# Patient Record
Sex: MALE | Race: WHITE | NOT HISPANIC OR LATINO | Employment: FULL TIME | URBAN - NONMETROPOLITAN AREA
[De-identification: names, ages, dates, MRNs, and addresses within clinical notes are randomized per-mention and may not be internally consistent; named-entity substitution may affect disease eponyms.]

---

## 2020-10-21 ENCOUNTER — HOSPITAL ENCOUNTER (EMERGENCY)
Facility: HOSPITAL | Age: 50
End: 2020-10-21
Attending: EMERGENCY MEDICINE | Admitting: EMERGENCY MEDICINE

## 2020-10-21 ENCOUNTER — HOSPITAL ENCOUNTER (OUTPATIENT)
Facility: HOSPITAL | Age: 50
Setting detail: OBSERVATION
LOS: 1 days | Discharge: HOME | End: 2020-10-21
Attending: EMERGENCY MEDICINE | Admitting: INTERNAL MEDICINE

## 2020-10-21 VITALS
OXYGEN SATURATION: 96 % | SYSTOLIC BLOOD PRESSURE: 242 MMHG | TEMPERATURE: 98.1 F | WEIGHT: 200 LBS | RESPIRATION RATE: 18 BRPM | HEART RATE: 81 BPM | DIASTOLIC BLOOD PRESSURE: 120 MMHG

## 2020-10-21 VITALS
SYSTOLIC BLOOD PRESSURE: 192 MMHG | BODY MASS INDEX: 28.63 KG/M2 | WEIGHT: 200 LBS | HEART RATE: 70 BPM | HEIGHT: 70 IN | TEMPERATURE: 98.1 F | DIASTOLIC BLOOD PRESSURE: 105 MMHG | RESPIRATION RATE: 20 BRPM | OXYGEN SATURATION: 98 %

## 2020-10-21 DIAGNOSIS — N48.30 PRIAPISM: Primary | ICD-10-CM

## 2020-10-21 DIAGNOSIS — N17.9 ACUTE KIDNEY INJURY (HCC): ICD-10-CM

## 2020-10-21 DIAGNOSIS — T19.4XXA FOREIGN BODY IN PENIS, INITIAL ENCOUNTER: ICD-10-CM

## 2020-10-21 DIAGNOSIS — N48.31: ICD-10-CM

## 2020-10-21 DIAGNOSIS — T19.4XXA: ICD-10-CM

## 2020-10-21 PROBLEM — I10 HYPERTENSION: Status: ACTIVE | Noted: 2020-10-21

## 2020-10-21 LAB
ALBUMIN SERPL BCP-MCNC: 4.1 G/DL (ref 3.5–5)
ALP SERPL-CCNC: 74 U/L (ref 46–116)
ALT SERPL W P-5'-P-CCNC: 29 U/L (ref 12–78)
ANION GAP SERPL CALCULATED.3IONS-SCNC: 7 MMOL/L (ref 4–13)
AST SERPL W P-5'-P-CCNC: 28 U/L (ref 5–45)
BASE EX.OXY STD BLDV CALC-SCNC: 57.7 % (ref 60–80)
BASE EXCESS BLDV CALC-SCNC: -7.7 MMOL/L
BASOPHILS # BLD AUTO: 0.06 THOUSANDS/ΜL (ref 0–0.1)
BASOPHILS NFR BLD AUTO: 1 % (ref 0–1)
BILIRUB SERPL-MCNC: 0.61 MG/DL (ref 0.2–1)
BUN SERPL-MCNC: 16 MG/DL (ref 5–25)
CALCIUM SERPL-MCNC: 8.7 MG/DL (ref 8.3–10.1)
CHLORIDE SERPL-SCNC: 102 MMOL/L (ref 100–108)
CO2 SERPL-SCNC: 28 MMOL/L (ref 21–32)
CREAT SERPL-MCNC: 1.7 MG/DL (ref 0.6–1.3)
EOSINOPHIL # BLD AUTO: 0.19 THOUSAND/ΜL (ref 0–0.61)
EOSINOPHIL NFR BLD AUTO: 2 % (ref 0–6)
ERYTHROCYTE [DISTWIDTH] IN BLOOD BY AUTOMATED COUNT: 13.1 % (ref 11.6–15.1)
GFR SERPL CREATININE-BSD FRML MDRD: 46 ML/MIN/1.73SQ M
GLUCOSE SERPL-MCNC: 120 MG/DL (ref 65–140)
HCO3 BLDV-SCNC: 23 MMOL/L (ref 24–30)
HCT VFR BLD AUTO: 45.5 % (ref 36.5–49.3)
HGB BLD-MCNC: 15.7 G/DL (ref 12–17)
IMM GRANULOCYTES # BLD AUTO: 0.05 THOUSAND/UL (ref 0–0.2)
IMM GRANULOCYTES NFR BLD AUTO: 1 % (ref 0–2)
LACTATE SERPL-SCNC: 1.7 MMOL/L (ref 0.5–2)
LYMPHOCYTES # BLD AUTO: 1.84 THOUSANDS/ΜL (ref 0.6–4.47)
LYMPHOCYTES NFR BLD AUTO: 17 % (ref 14–44)
MCH RBC QN AUTO: 31.6 PG (ref 26.8–34.3)
MCHC RBC AUTO-ENTMCNC: 34.5 G/DL (ref 31.4–37.4)
MCV RBC AUTO: 92 FL (ref 82–98)
MONOCYTES # BLD AUTO: 0.84 THOUSAND/ΜL (ref 0.17–1.22)
MONOCYTES NFR BLD AUTO: 8 % (ref 4–12)
NEUTROPHILS # BLD AUTO: 7.77 THOUSANDS/ΜL (ref 1.85–7.62)
NEUTS SEG NFR BLD AUTO: 71 % (ref 43–75)
NRBC BLD AUTO-RTO: 0 /100 WBCS
O2 CT BLDV-SCNC: 16.9 ML/DL
PCO2 BLDV: 66.6 MM HG (ref 42–50)
PH BLDV: 7.16 [PH] (ref 7.3–7.4)
PLATELET # BLD AUTO: 332 THOUSANDS/UL (ref 149–390)
PMV BLD AUTO: 9.8 FL (ref 8.9–12.7)
PO2 BLDV: 34.8 MM HG (ref 35–45)
POTASSIUM SERPL-SCNC: 3.4 MMOL/L (ref 3.5–5.3)
PROT SERPL-MCNC: 7.3 G/DL (ref 6.4–8.2)
RBC # BLD AUTO: 4.97 MILLION/UL (ref 3.88–5.62)
SODIUM SERPL-SCNC: 137 MMOL/L (ref 136–145)
WBC # BLD AUTO: 10.75 THOUSAND/UL (ref 4.31–10.16)

## 2020-10-21 PROCEDURE — 99284 EMERGENCY DEPT VISIT MOD MDM: CPT

## 2020-10-21 PROCEDURE — NC001 PR NO CHARGE: Performed by: PHYSICIAN ASSISTANT

## 2020-10-21 PROCEDURE — 99284 EMERGENCY DEPT VISIT MOD MDM: CPT | Performed by: EMERGENCY MEDICINE

## 2020-10-21 PROCEDURE — 36415 COLL VENOUS BLD VENIPUNCTURE: CPT | Performed by: EMERGENCY MEDICINE

## 2020-10-21 PROCEDURE — 99285 EMERGENCY DEPT VISIT HI MDM: CPT | Performed by: EMERGENCY MEDICINE

## 2020-10-21 PROCEDURE — 83605 ASSAY OF LACTIC ACID: CPT | Performed by: EMERGENCY MEDICINE

## 2020-10-21 PROCEDURE — 85025 COMPLETE CBC W/AUTO DIFF WBC: CPT | Performed by: EMERGENCY MEDICINE

## 2020-10-21 PROCEDURE — NC001 PR NO CHARGE: Performed by: INTERNAL MEDICINE

## 2020-10-21 PROCEDURE — 80053 COMPREHEN METABOLIC PANEL: CPT | Performed by: EMERGENCY MEDICINE

## 2020-10-21 PROCEDURE — 99219 PR INITIAL OBSERVATION CARE/DAY 50 MINUTES: CPT | Performed by: INTERNAL MEDICINE

## 2020-10-21 PROCEDURE — 82805 BLOOD GASES W/O2 SATURATION: CPT | Performed by: EMERGENCY MEDICINE

## 2020-10-21 PROCEDURE — 99204 OFFICE O/P NEW MOD 45 MIN: CPT | Performed by: UROLOGY

## 2020-10-21 PROCEDURE — 96374 THER/PROPH/DIAG INJ IV PUSH: CPT

## 2020-10-21 RX ORDER — TERBUTALINE SULFATE 1 MG/ML
0.25 INJECTION, SOLUTION SUBCUTANEOUS ONCE
Status: COMPLETED | OUTPATIENT
Start: 2020-10-21 | End: 2020-10-21

## 2020-10-21 RX ORDER — NAPROXEN 500 MG/1
500 TABLET ORAL EVERY 12 HOURS PRN
COMMUNITY
Start: 2020-07-08 | End: 2020-10-21

## 2020-10-21 RX ORDER — LISINOPRIL 10 MG/1
10 TABLET ORAL DAILY
COMMUNITY
Start: 2020-07-08 | End: 2020-10-21

## 2020-10-21 RX ORDER — PSEUDOEPHEDRINE HYDROCHLORIDE 30 MG/1
60 TABLET ORAL ONCE
Status: DISCONTINUED | OUTPATIENT
Start: 2020-10-21 | End: 2020-10-21

## 2020-10-21 RX ORDER — FENTANYL CITRATE 50 UG/ML
50 INJECTION, SOLUTION INTRAMUSCULAR; INTRAVENOUS ONCE
Status: COMPLETED | OUTPATIENT
Start: 2020-10-21 | End: 2020-10-21

## 2020-10-21 RX ORDER — BUPIVACAINE HYDROCHLORIDE 5 MG/ML
10 INJECTION, SOLUTION EPIDURAL; INTRACAUDAL ONCE
Status: COMPLETED | OUTPATIENT
Start: 2020-10-21 | End: 2020-10-21

## 2020-10-21 RX ADMIN — FENTANYL CITRATE 50 MCG: 50 INJECTION INTRAMUSCULAR; INTRAVENOUS at 06:30

## 2020-10-21 RX ADMIN — BUPIVACAINE HYDROCHLORIDE 10 ML: 5 INJECTION, SOLUTION EPIDURAL; INTRACAUDAL at 09:30

## 2020-10-21 RX ADMIN — TERBUTALINE SULFATE 0.25 MG: 1 INJECTION SUBCUTANEOUS at 06:36

## 2021-04-01 ENCOUNTER — HOSPITAL ENCOUNTER (INPATIENT)
Facility: HOSPITAL | Age: 51
LOS: 5 days | Discharge: HOME | DRG: 045 | End: 2021-04-06
Attending: EMERGENCY MEDICINE | Admitting: STUDENT IN AN ORGANIZED HEALTH CARE EDUCATION/TRAINING PROGRAM
Payer: COMMERCIAL

## 2021-04-01 ENCOUNTER — APPOINTMENT (EMERGENCY)
Dept: CT IMAGING | Facility: HOSPITAL | Age: 51
DRG: 045 | End: 2021-04-01
Payer: COMMERCIAL

## 2021-04-01 ENCOUNTER — APPOINTMENT (EMERGENCY)
Dept: RADIOLOGY | Facility: HOSPITAL | Age: 51
DRG: 045 | End: 2021-04-01
Payer: COMMERCIAL

## 2021-04-01 DIAGNOSIS — I10 ESSENTIAL HYPERTENSION: ICD-10-CM

## 2021-04-01 DIAGNOSIS — F32.1 CURRENT MODERATE EPISODE OF MAJOR DEPRESSIVE DISORDER WITHOUT PRIOR EPISODE (HCC): ICD-10-CM

## 2021-04-01 DIAGNOSIS — R47.81 SLURRED SPEECH: ICD-10-CM

## 2021-04-01 DIAGNOSIS — R09.89 SYMPTOMS OF CEREBROVASCULAR ACCIDENT (CVA): ICD-10-CM

## 2021-04-01 DIAGNOSIS — N17.9 ACUTE KIDNEY INJURY (HCC): ICD-10-CM

## 2021-04-01 DIAGNOSIS — R77.8 ELEVATED TROPONIN I LEVEL: ICD-10-CM

## 2021-04-01 DIAGNOSIS — R60.0 BILATERAL LEG EDEMA: ICD-10-CM

## 2021-04-01 DIAGNOSIS — I63.9 ACUTE CVA (CEREBROVASCULAR ACCIDENT) (HCC): ICD-10-CM

## 2021-04-01 DIAGNOSIS — I16.1 HYPERTENSIVE EMERGENCY: Primary | ICD-10-CM

## 2021-04-01 DIAGNOSIS — E78.5 HYPERLIPIDEMIA: ICD-10-CM

## 2021-04-01 DIAGNOSIS — I63.9 EMBOLIC STROKE (HCC): ICD-10-CM

## 2021-04-01 PROBLEM — I16.0 HYPERTENSIVE URGENCY: Status: ACTIVE | Noted: 2021-04-01

## 2021-04-01 PROBLEM — R79.89 ELEVATED TROPONIN LEVEL NOT DUE TO ACUTE CORONARY SYNDROME: Status: ACTIVE | Noted: 2021-04-01

## 2021-04-01 PROBLEM — F19.90 DRUG USE: Status: ACTIVE | Noted: 2021-04-01

## 2021-04-01 PROBLEM — Z72.0 TOBACCO USE: Status: ACTIVE | Noted: 2021-04-01

## 2021-04-01 PROBLEM — D72.829 LEUKOCYTOSIS: Status: ACTIVE | Noted: 2021-04-01

## 2021-04-01 LAB
ALBUMIN SERPL BCP-MCNC: 3.8 G/DL (ref 3.5–5)
ALP SERPL-CCNC: 84 U/L (ref 46–116)
ALT SERPL W P-5'-P-CCNC: 32 U/L (ref 12–78)
ANION GAP SERPL CALCULATED.3IONS-SCNC: 8 MMOL/L (ref 4–13)
APTT PPP: 28 SECONDS (ref 23–37)
AST SERPL W P-5'-P-CCNC: 18 U/L (ref 5–45)
BASOPHILS # BLD AUTO: 0.08 THOUSANDS/ΜL (ref 0–0.1)
BASOPHILS NFR BLD AUTO: 1 % (ref 0–1)
BILIRUB SERPL-MCNC: 0.47 MG/DL (ref 0.2–1)
BUN SERPL-MCNC: 17 MG/DL (ref 5–25)
CALCIUM SERPL-MCNC: 9.3 MG/DL (ref 8.3–10.1)
CHLORIDE SERPL-SCNC: 99 MMOL/L (ref 100–108)
CHOLEST SERPL-MCNC: 184 MG/DL (ref 50–200)
CO2 SERPL-SCNC: 33 MMOL/L (ref 21–32)
CREAT SERPL-MCNC: 1.53 MG/DL (ref 0.6–1.3)
EOSINOPHIL # BLD AUTO: 0.09 THOUSAND/ΜL (ref 0–0.61)
EOSINOPHIL NFR BLD AUTO: 1 % (ref 0–6)
ERYTHROCYTE [DISTWIDTH] IN BLOOD BY AUTOMATED COUNT: 13.1 % (ref 11.6–15.1)
EST. AVERAGE GLUCOSE BLD GHB EST-MCNC: 120 MG/DL
GFR SERPL CREATININE-BSD FRML MDRD: 52 ML/MIN/1.73SQ M
GLUCOSE SERPL-MCNC: 100 MG/DL (ref 65–140)
HBA1C MFR BLD: 5.8 %
HCT VFR BLD AUTO: 49.2 % (ref 36.5–49.3)
HDLC SERPL-MCNC: 41 MG/DL
HGB BLD-MCNC: 16.9 G/DL (ref 12–17)
IMM GRANULOCYTES # BLD AUTO: 0.09 THOUSAND/UL (ref 0–0.2)
IMM GRANULOCYTES NFR BLD AUTO: 1 % (ref 0–2)
INR PPP: 0.89 (ref 0.84–1.19)
LACTATE SERPL-SCNC: 1.3 MMOL/L (ref 0.5–2)
LDLC SERPL CALC-MCNC: 94 MG/DL (ref 0–100)
LIPASE SERPL-CCNC: 88 U/L (ref 73–393)
LYMPHOCYTES # BLD AUTO: 2.4 THOUSANDS/ΜL (ref 0.6–4.47)
LYMPHOCYTES NFR BLD AUTO: 19 % (ref 14–44)
MCH RBC QN AUTO: 31.1 PG (ref 26.8–34.3)
MCHC RBC AUTO-ENTMCNC: 34.3 G/DL (ref 31.4–37.4)
MCV RBC AUTO: 91 FL (ref 82–98)
MONOCYTES # BLD AUTO: 0.9 THOUSAND/ΜL (ref 0.17–1.22)
MONOCYTES NFR BLD AUTO: 7 % (ref 4–12)
NEUTROPHILS # BLD AUTO: 8.84 THOUSANDS/ΜL (ref 1.85–7.62)
NEUTS SEG NFR BLD AUTO: 71 % (ref 43–75)
NRBC BLD AUTO-RTO: 0 /100 WBCS
NT-PROBNP SERPL-MCNC: 514 PG/ML
PLATELET # BLD AUTO: 296 THOUSANDS/UL (ref 149–390)
PLATELET # BLD AUTO: 324 THOUSANDS/UL (ref 149–390)
PMV BLD AUTO: 9.7 FL (ref 8.9–12.7)
PMV BLD AUTO: 9.7 FL (ref 8.9–12.7)
POTASSIUM SERPL-SCNC: 3.6 MMOL/L (ref 3.5–5.3)
PROT SERPL-MCNC: 7.6 G/DL (ref 6.4–8.2)
PROTHROMBIN TIME: 11.9 SECONDS (ref 11.6–14.5)
RBC # BLD AUTO: 5.43 MILLION/UL (ref 3.88–5.62)
SODIUM SERPL-SCNC: 140 MMOL/L (ref 136–145)
TRIGL SERPL-MCNC: 244 MG/DL
TROPONIN I SERPL-MCNC: 0.72 NG/ML
TROPONIN I SERPL-MCNC: 0.89 NG/ML
TROPONIN I SERPL-MCNC: 0.89 NG/ML
WBC # BLD AUTO: 12.4 THOUSAND/UL (ref 4.31–10.16)

## 2021-04-01 PROCEDURE — 84484 ASSAY OF TROPONIN QUANT: CPT | Performed by: STUDENT IN AN ORGANIZED HEALTH CARE EDUCATION/TRAINING PROGRAM

## 2021-04-01 PROCEDURE — 85610 PROTHROMBIN TIME: CPT | Performed by: EMERGENCY MEDICINE

## 2021-04-01 PROCEDURE — 71045 X-RAY EXAM CHEST 1 VIEW: CPT

## 2021-04-01 PROCEDURE — 99285 EMERGENCY DEPT VISIT HI MDM: CPT

## 2021-04-01 PROCEDURE — 83036 HEMOGLOBIN GLYCOSYLATED A1C: CPT | Performed by: STUDENT IN AN ORGANIZED HEALTH CARE EDUCATION/TRAINING PROGRAM

## 2021-04-01 PROCEDURE — 96375 TX/PRO/DX INJ NEW DRUG ADDON: CPT

## 2021-04-01 PROCEDURE — 83690 ASSAY OF LIPASE: CPT | Performed by: EMERGENCY MEDICINE

## 2021-04-01 PROCEDURE — 80053 COMPREHEN METABOLIC PANEL: CPT | Performed by: EMERGENCY MEDICINE

## 2021-04-01 PROCEDURE — 70450 CT HEAD/BRAIN W/O DYE: CPT

## 2021-04-01 PROCEDURE — 84484 ASSAY OF TROPONIN QUANT: CPT | Performed by: EMERGENCY MEDICINE

## 2021-04-01 PROCEDURE — 99291 CRITICAL CARE FIRST HOUR: CPT | Performed by: EMERGENCY MEDICINE

## 2021-04-01 PROCEDURE — 83880 ASSAY OF NATRIURETIC PEPTIDE: CPT | Performed by: EMERGENCY MEDICINE

## 2021-04-01 PROCEDURE — 83605 ASSAY OF LACTIC ACID: CPT | Performed by: EMERGENCY MEDICINE

## 2021-04-01 PROCEDURE — G1004 CDSM NDSC: HCPCS

## 2021-04-01 PROCEDURE — 85025 COMPLETE CBC W/AUTO DIFF WBC: CPT | Performed by: EMERGENCY MEDICINE

## 2021-04-01 PROCEDURE — 99223 1ST HOSP IP/OBS HIGH 75: CPT | Performed by: STUDENT IN AN ORGANIZED HEALTH CARE EDUCATION/TRAINING PROGRAM

## 2021-04-01 PROCEDURE — 36415 COLL VENOUS BLD VENIPUNCTURE: CPT | Performed by: EMERGENCY MEDICINE

## 2021-04-01 PROCEDURE — 96374 THER/PROPH/DIAG INJ IV PUSH: CPT

## 2021-04-01 PROCEDURE — 80061 LIPID PANEL: CPT | Performed by: STUDENT IN AN ORGANIZED HEALTH CARE EDUCATION/TRAINING PROGRAM

## 2021-04-01 PROCEDURE — 93005 ELECTROCARDIOGRAM TRACING: CPT

## 2021-04-01 PROCEDURE — 85049 AUTOMATED PLATELET COUNT: CPT | Performed by: STUDENT IN AN ORGANIZED HEALTH CARE EDUCATION/TRAINING PROGRAM

## 2021-04-01 PROCEDURE — 85730 THROMBOPLASTIN TIME PARTIAL: CPT | Performed by: EMERGENCY MEDICINE

## 2021-04-01 RX ORDER — METOPROLOL TARTRATE 5 MG/5ML
5 INJECTION INTRAVENOUS ONCE
Status: COMPLETED | OUTPATIENT
Start: 2021-04-01 | End: 2021-04-01

## 2021-04-01 RX ORDER — AMLODIPINE BESYLATE 10 MG/1
10 TABLET ORAL DAILY
Status: DISCONTINUED | OUTPATIENT
Start: 2021-04-01 | End: 2021-04-02

## 2021-04-01 RX ORDER — HEPARIN SODIUM 5000 [USP'U]/ML
5000 INJECTION, SOLUTION INTRAVENOUS; SUBCUTANEOUS EVERY 8 HOURS SCHEDULED
Status: DISCONTINUED | OUTPATIENT
Start: 2021-04-01 | End: 2021-04-06 | Stop reason: HOSPADM

## 2021-04-01 RX ORDER — HYDRALAZINE HYDROCHLORIDE 20 MG/ML
10 INJECTION INTRAMUSCULAR; INTRAVENOUS ONCE
Status: COMPLETED | OUTPATIENT
Start: 2021-04-01 | End: 2021-04-01

## 2021-04-01 RX ORDER — CLONIDINE HYDROCHLORIDE 0.1 MG/1
0.2 TABLET ORAL ONCE
Status: COMPLETED | OUTPATIENT
Start: 2021-04-01 | End: 2021-04-01

## 2021-04-01 RX ORDER — LABETALOL 20 MG/4 ML (5 MG/ML) INTRAVENOUS SYRINGE
10 EVERY 6 HOURS PRN
Status: DISCONTINUED | OUTPATIENT
Start: 2021-04-01 | End: 2021-04-03

## 2021-04-01 RX ADMIN — LABETALOL 20 MG/4 ML (5 MG/ML) INTRAVENOUS SYRINGE 10 MG: at 18:26

## 2021-04-01 RX ADMIN — HYDRALAZINE HYDROCHLORIDE 10 MG: 20 INJECTION INTRAMUSCULAR; INTRAVENOUS at 12:35

## 2021-04-01 RX ADMIN — AMLODIPINE BESYLATE 10 MG: 10 TABLET ORAL at 18:25

## 2021-04-01 RX ADMIN — HEPARIN SODIUM 5000 UNITS: 5000 INJECTION INTRAVENOUS; SUBCUTANEOUS at 18:25

## 2021-04-01 RX ADMIN — METOPROLOL TARTRATE 5 MG: 5 INJECTION INTRAVENOUS at 13:13

## 2021-04-01 RX ADMIN — CLONIDINE HYDROCHLORIDE 0.2 MG: 0.1 TABLET ORAL at 14:31

## 2021-04-01 NOTE — PLAN OF CARE
Problem: Potential for Falls  Goal: Patient will remain free of falls  Description: INTERVENTIONS:  - Assess patient frequently for physical needs  -  Identify cognitive and physical deficits and behaviors that affect risk of falls    -  [place] fall precautions as indicated by assessment   - Educate patient/family on patient safety including physical limitations  - Instruct patient to call for assistance with activity based on assessment  - Modify environment to reduce risk of injury  - Consider OT/PT consult to assist with strengthening/mobility  Outcome: Progressing     Problem: CARDIOVASCULAR - ADULT  Goal: Maintains optimal cardiac output and hemodynamic stability  Description: INTERVENTIONS:  - Monitor I/O, vital signs and rhythm  - Monitor for S/S and trends of decreased cardiac output  - Administer and titrate ordered vasoactive medications to optimize hemodynamic stability  - Assess quality of pulses, skin color and temperature  - Assess for signs of decreased coronary artery perfusion  - Instruct patient to report change in severity of symptoms  Outcome: Progressing  Goal: Absence of cardiac dysrhythmias or at baseline rhythm  Description: INTERVENTIONS:  - Continuous cardiac monitoring, vital signs, obtain 12 lead EKG if ordered  - Administer antiarrhythmic and heart rate control medications as ordered  - Monitor electrolytes and administer replacement therapy as ordered  Outcome: Progressing     Problem: PAIN - ADULT  Goal: Verbalizes/displays adequate comfort level or baseline comfort level  Description: Interventions:  - Encourage patient to monitor pain and request assistance  - Assess pain using appropriate pain scale  - Administer analgesics based on type and severity of pain and evaluate response  - Implement non-pharmacological measures as appropriate and evaluate response  - Consider cultural and social influences on pain and pain management  - Notify physician/advanced practitioner if interventions unsuccessful or patient reports new pain  Outcome: Progressing     Problem: INFECTION - ADULT  Goal: Absence or prevention of progression during hospitalization  Description: INTERVENTIONS:  - Assess and monitor for signs and symptoms of infection  - Monitor lab/diagnostic results  - Monitor all insertion sites, i e  indwelling lines, tubes, and drains  - Monitor endotracheal if appropriate and nasal secretions for changes in amount and color  - [place] appropriate cooling/warming therapies per order  - Administer medications as ordered  - Instruct and encourage patient and family to use good hand hygiene technique  - Identify and instruct in appropriate isolation precautions for identified infection/condition  Outcome: Progressing  Goal: Absence of fever/infection during neutropenic period  Description: INTERVENTIONS:  - Monitor WBC    Outcome: Progressing     Problem: SAFETY ADULT  Goal: Patient will remain free of falls  Description: INTERVENTIONS:  - Assess patient frequently for physical needs  -  Identify cognitive and physical deficits and behaviors that affect risk of falls    -  [place] fall precautions as indicated by assessment   - Educate patient/family on patient safety including physical limitations  - Instruct patient to call for assistance with activity based on assessment  - Modify environment to reduce risk of injury  - Consider OT/PT consult to assist with strengthening/mobility  Outcome: Progressing  Goal: Maintain or return to baseline ADL function  Description: INTERVENTIONS:  -  Assess patient's ability to carry out ADLs; assess patient's baseline for ADL function and identify physical deficits which impact ability to perform ADLs (bathing, care of mouth/teeth, toileting, grooming, dressing, etc )  - Assess/evaluate cause of self-care deficits   - Assess range of motion  - Assess patient's mobility; develop plan if impaired  - Assess patient's need for assistive devices and provide as appropriate  - Encourage maximum independence but intervene and supervise when necessary  - Involve family in performance of ADLs  - Assess for home care needs following discharge   - Consider OT consult to assist with ADL evaluation and planning for discharge  - Provide patient education as appropriate  Outcome: Progressing  Goal: Maintain or return mobility status to optimal level  Description: INTERVENTIONS:  - Assess patient's baseline mobility status (ambulation, transfers, stairs, etc )    - Identify cognitive and physical deficits and behaviors that affect mobility  - Identify mobility aids required to assist with transfers and/or ambulation (gait belt, sit-to-stand, lift, walker, cane, etc )  - [place] fall precautions as indicated by assessment  - Record patient progress and toleration of activity level on Mobility SBAR; progress patient to next Phase/Stage  - Instruct patient to call for assistance with activity based on assessment  - Consider rehabilitation consult to assist with strengthening/weightbearing, etc   Outcome: Progressing     Problem: DISCHARGE PLANNING  Goal: Discharge to home or other facility with appropriate resources  Description: INTERVENTIONS:  - Identify barriers to discharge w/patient and caregiver  - Arrange for needed discharge resources and transportation as appropriate  - Identify discharge learning needs (meds, wound care, etc )  - Arrange for interpretive services to assist at discharge as needed  - Refer to Case Management Department for coordinating discharge planning if the patient needs post-hospital services based on physician/advanced practitioner order or complex needs related to functional status, cognitive ability, or social support system  Outcome: Progressing     Problem: Knowledge Deficit  Goal: Patient/family/caregiver demonstrates understanding of disease process, treatment plan, medications, and discharge instructions  Description: Complete learning assessment and assess knowledge base    Interventions:  - Provide teaching at level of understanding  - Provide teaching via preferred learning methods  Outcome: Progressing

## 2021-04-01 NOTE — ASSESSMENT & PLAN NOTE
· Admits to heavy smoking - 36 pack year smoker   · Offered nicotine patches however patient refused  · Counseled on smoking cessation

## 2021-04-01 NOTE — ASSESSMENT & PLAN NOTE
· Patient presenting with BP in 200s/100s  · No known history of HTN however has not seen a physician in >10 years  · Never been on any anti-hypertensive medications  · Patient states that he has been having weakness and slurred speech associated over the last 3 weeks  · No focal deficits on exam  · Patient also admits to methamphetamine use as well as marijuana - denies recent use however may contribute to his BP  · utox ordered  · [facility] as follows     "No evidence of acute infarct or intracranial hemorrhage    Patchy areas of microvascular ischemic change with small lacunar infarcts    Asymmetric right transverse and sigmoid sinus commonly can be a normal variant    If there is significant posterior fossa symptomatology clinically, further imaging assessment could be performed "    · BP still elevated despite hydralazine  · Will order prn labetalol and start patient on amlodipine 10 mg daily

## 2021-04-01 NOTE — ASSESSMENT & PLAN NOTE
· Troponin elevated to 0 72 on admission, denies any chest pain however does have shortness of breath  · Troponin elevation in setting of significant hypertension, possibly secondary to demand  · Will trend troponin  · Monitor patient on telemetry  · Obtain TTE given patient's history of shortness of breath and intermittent leg swelling  · Serial EKG

## 2021-04-01 NOTE — ED PROVIDER NOTES
History  Chief Complaint   Patient presents with    Hypertension     Pt reports slurred speech and off balance for the past 2-3 weeks  Pt went to sisters home today and took his BP while there which it was 230/133     Patient complains of slurred speech and feeling off balance for the past 3 weeks  Took his blood pressure today and was elevated at 2 30/133  Denies any chest pain  Denies any shortness of breath  No headaches  No nausea or vomiting  No focal weakness or numbness  States the slurred speech in the off balance feeling have been constant for the past 3 weeks  Patient is not take any antihypertensive medications  Patient assume the slurred speech was from his tooth being removed 3 weeks ago  Speech has not improved since he had an implant put in today  History provided by:  Patient   used: No    Hypertension  Severity:  Moderate  Onset quality:  Unable to specify  Timing:  Constant  Progression:  Unchanged  Chronicity:  New  Context: normal sodium, not herbal remedies and not OTC medications used    Relieved by:  Nothing  Worsened by:  Nothing  Ineffective treatments:  None tried  Associated symptoms: dizziness    Associated symptoms: no abdominal pain, no chest pain, no ear pain, no fever, no headaches, no hematuria, no loss of consciousness, no nausea, no neck pain, no palpitations, no peripheral edema, no shortness of breath, no syncope, not vomiting and no weakness        None       Past Medical History:   Diagnosis Date    Gout     Hypertension        History reviewed  No pertinent surgical history  History reviewed  No pertinent family history  I have reviewed and agree with the history as documented  E-Cigarette/Vaping     E-Cigarette/Vaping Substances     Social History     Tobacco Use    Smoking status: Current Some Day Smoker    Smokeless tobacco: Never Used   Substance Use Topics    Alcohol use:  Yes    Drug use: Yes     Types: Marijuana Review of Systems   Constitutional: Negative for chills and fever  HENT: Negative for ear pain, hearing loss, sore throat, trouble swallowing and voice change  Eyes: Negative for pain and discharge  Respiratory: Negative for cough, shortness of breath and wheezing  Cardiovascular: Negative for chest pain, palpitations and syncope  Gastrointestinal: Negative for abdominal pain, blood in stool, constipation, diarrhea, nausea and vomiting  Genitourinary: Negative for dysuria, flank pain, frequency and hematuria  Musculoskeletal: Negative for joint swelling, neck pain and neck stiffness  Skin: Negative for rash and wound  Neurological: Positive for dizziness  Negative for seizures, loss of consciousness, syncope, facial asymmetry, weakness and headaches  Psychiatric/Behavioral: Negative for hallucinations, self-injury and suicidal ideas  All other systems reviewed and are negative  Physical Exam  Physical Exam  Vitals signs and nursing note reviewed  Constitutional:       General: He is not in acute distress  Appearance: He is well-developed  HENT:      Head: Normocephalic and atraumatic  Right Ear: External ear normal       Left Ear: External ear normal    Eyes:      Conjunctiva/sclera: Conjunctivae normal       Pupils: Pupils are equal, round, and reactive to light  Neck:      Musculoskeletal: Normal range of motion and neck supple  Cardiovascular:      Rate and Rhythm: Normal rate and regular rhythm  Heart sounds: Normal heart sounds  No murmur  Pulmonary:      Effort: Pulmonary effort is normal       Breath sounds: Normal breath sounds  No wheezing or rales  Abdominal:      General: Bowel sounds are normal  There is no distension  Palpations: Abdomen is soft  Tenderness: There is no abdominal tenderness  There is no guarding or rebound  Musculoskeletal: Normal range of motion  General: No deformity     Skin:     General: Skin is warm and dry       Findings: No rash  Neurological:      General: No focal deficit present  Mental Status: He is alert and oriented to person, place, and time  Cranial Nerves: No cranial nerve deficit  Psychiatric:         Behavior: Behavior normal          Vital Signs  ED Triage Vitals [04/01/21 1224]   Temperature Pulse Respirations Blood Pressure SpO2   97 6 °F (36 4 °C) 100 19 (!) 232/142 99 %      Temp Source Heart Rate Source Patient Position - Orthostatic VS BP Location FiO2 (%)   Temporal Monitor Lying Right arm --      Pain Score       --           Vitals:    04/01/21 1224 04/01/21 1300 04/01/21 1431 04/01/21 1438   BP: (!) 232/142 (!) 190/103 (!) 177/95 (!) 179/106   Pulse: 100 99 71 68   Patient Position - Orthostatic VS: Lying Lying Lying Lying         Visual Acuity  Visual Acuity      Most Recent Value   L Pupil Size (mm)  3   R Pupil Size (mm)  3          ED Medications  Medications   hydrALAZINE (APRESOLINE) injection 10 mg (10 mg Intravenous Given 4/1/21 1235)   metoprolol (LOPRESSOR) injection 5 mg (5 mg Intravenous Given 4/1/21 1313)   cloNIDine (CATAPRES) tablet 0 2 mg (0 2 mg Oral Given 4/1/21 1431)       Diagnostic Studies  Results Reviewed     Procedure Component Value Units Date/Time    NT-BNP PRO [[id number]]  (Abnormal) Collected: 04/01/21 1231    Lab Status: Final result Specimen: Blood from Arm, Left Updated: 04/01/21 1339     NT-proBNP 514 pg/mL     Troponin I [[id number]]  (Abnormal) Collected: 04/01/21 1231    Lab Status: Final result Specimen: Blood from Arm, Left Updated: 04/01/21 1302     Troponin I 0 72 ng/mL     Lactic acid [[id number]]  (Normal) Collected: 04/01/21 1231    Lab Status: Final result Specimen: Blood from Arm, Left Updated: 04/01/21 1258     LACTIC ACID 1 3 mmol/L     Narrative:      Result may be elevated if tourniquet was used during collection      Comprehensive metabolic panel [[id number]]  (Abnormal) Collected: 04/01/21 1231    Lab Status: Final result Specimen: Blood from Arm, Left Updated: 04/01/21 1255     Sodium 140 mmol/L      Potassium 3 6 mmol/L      Chloride 99 mmol/L      CO2 33 mmol/L      ANION GAP 8 mmol/L      BUN 17 mg/dL      Creatinine 1 53 mg/dL      Glucose 100 mg/dL      Calcium 9 3 mg/dL      AST 18 U/L      ALT 32 U/L      Alkaline Phosphatase 84 U/L      Total Protein 7 6 g/dL      Albumin 3 8 g/dL      Total Bilirubin 0 47 mg/dL      eGFR 52 ml/min/1 73sq m     Narrative:      [place] guidelines for Chronic Kidney Disease (CKD):     Stage 1 with normal or high GFR (GFR > 90 mL/min/1 73 square meters)    Stage 2 Mild CKD (GFR = 60-89 mL/min/1 73 square meters)    Stage 3A Moderate CKD (GFR = 45-59 mL/min/1 73 square meters)    Stage 3B Moderate CKD (GFR = 30-44 mL/min/1 73 square meters)    Stage 4 Severe CKD (GFR = 15-29 mL/min/1 73 square meters)    Stage 5 End Stage CKD (GFR <15 mL/min/1 73 square meters)  Note: GFR calculation is accurate only with a steady state creatinine    Lipase [[id number]]  (Normal) Collected: 04/01/21 1231    Lab Status: Final result Specimen: Blood from Arm, Left Updated: 04/01/21 1255     Lipase 88 u/L     Protime-INR [[id number]]  (Normal) Collected: 04/01/21 1231    Lab Status: Final result Specimen: Blood from Arm, Left Updated: 04/01/21 1251     Protime 11 9 seconds      INR 0 89    APTT [[id number]]  (Normal) Collected: 04/01/21 1231    Lab Status: Final result Specimen: Blood from Arm, Left Updated: 04/01/21 1251     PTT 28 seconds     CBC and differential [[id number]]  (Abnormal) Collected: 04/01/21 1231    Lab Status: Final result Specimen: Blood from Arm, Left Updated: 04/01/21 1237     WBC 12 40 Thousand/uL      RBC 5 43 Million/uL      Hemoglobin 16 9 g/dL      Hematocrit 49 2 %      MCV 91 fL      MCH 31 1 pg      MCHC 34 3 g/dL      RDW 13 1 %      MPV 9 7 fL      Platelets 088 Thousands/uL      nRBC 0 /100 WBCs      Neutrophils Relative 71 %      Immat GRANS % 1 % Lymphocytes Relative 19 %      Monocytes Relative 7 %      Eosinophils Relative 1 %      Basophils Relative 1 %      Neutrophils Absolute 8 84 Thousands/µL      Immature Grans Absolute 0 09 Thousand/uL      Lymphocytes Absolute 2 40 Thousands/µL      Monocytes Absolute 0 90 Thousand/µL      Eosinophils Absolute 0 09 Thousand/µL      Basophils Absolute 0 08 Thousands/µL                  XR chest 1 view portable   Final Result by Cj Johnston MD (04/01 1152)      No acute cardiopulmonary disease                    Workstation performed: [id number]         CT head without contrast    (Results Pending)              Procedures  ECG 12 Lead Documentation Only    Date/Time: 4/1/2021 12:31 PM  Performed by: Suma Sanchez MD  Authorized by: Suma Sanchez MD     ECG reviewed by me, the ED Provider: yes    Patient location:  ED  Previous ECG:     Previous ECG:  Unavailable  Interpretation:     Interpretation: non-specific    Rate:     ECG rate:  90  Rhythm:     Rhythm: sinus rhythm    Ectopy:     Ectopy: none    QRS:     QRS axis:  Normal    CriticalCare Time  Performed by: Suma Sanchez MD  Authorized by: Suma Sanchez MD     Critical care provider statement:     Critical care time (minutes):  35    Critical care time was exclusive of:  Separately billable procedures and treating other patients    Critical care was necessary to treat or prevent imminent or life-threatening deterioration of the following conditions:  Cardiac failure    Critical care was time spent personally by me on the following activities:  Ordering and review of radiographic studies, ordering and review of laboratory studies, ordering and performing treatments and interventions, obtaining history from patient or surrogate, development of treatment plan with patient or surrogate, discussions with consultants, re-evaluation of patient's condition, evaluation of patient's response to treatment and examination of patient             ED Course  ED Course as of Apr 01 1509   Thu Apr 01, 2021   1240 In review of his old chart, the patient was on lisinopril in past   Patient does recall this  States he was on something but stopped taking it when the prescription ran out  1258 Creatinine in October was 1 7  Creatinine(!): 1 53   1346 Discussed with radiology doctor, Dr Vernon Velez  No infarct  No definite bleed noted  SBIRT 20yo+      Most Recent Value   SBIRT (24 yo +)   In order to provide better care to our patients, we are screening all of our patients for alcohol and drug use  Would it be okay to ask you these screening questions? No Filed at: 04/01/2021 1236   Initial Alcohol Screen: US AUDIT-C    1  How often do you have a drink containing alcohol?  0 Filed at: 04/01/2021 1236   2  How many drinks containing alcohol do you have on a typical day you are drinking? 0 Filed at: 04/01/2021 1236   3a  Male UNDER 65: How often do you have five or more drinks on one occasion? 0 Filed at: 04/01/2021 1236   3b  FEMALE Any Age, or MALE 65+: How often do you have 4 or more drinks on one occassion? 0 Filed at: 04/01/2021 1236   Audit-C Score  0 Filed at: 04/01/2021 1236   JUSTINE: How many times in the past year have you    Used an illegal drug or used a prescription medication for non-medical reasons? Never Filed at: 04/01/2021 1236                    MDM  Number of Diagnoses or Management Options  Elevated troponin I level:   Hypertensive emergency:   Slurred speech:   Diagnosis management comments: CT scan as read by radiologist shows no acute infarct or bleed  Patient with an elevated blood pressure  His troponin is elevated which could be due to the persistent high blood pressure  He has responded well to the hydralazine Lopressor as his blood pressure now is 190/100         Amount and/or Complexity of Data Reviewed  Clinical lab tests: reviewed  Review and summarize past medical records: yes        Disposition  Final diagnoses:   Hypertensive emergency   Elevated troponin I level   Slurred speech     Time reflects when diagnosis was documented in both MDM as applicable and the Disposition within this note     Time User Action Codes Description Comment    4/1/2021  1:02 PM Stew Aguirre Add [I10] Essential hypertension     4/1/2021  1:03 PM Betsy Aguirre Remove [I10] Essential hypertension     4/1/2021  1:03 PM Stew Aguirre Add [I16 1] Hypertensive emergency     4/1/2021  1:03 PM Stew Aguirre Add [R77 8] Elevated troponin I level     4/1/2021  1:35 PM Stew Aguirre Add [T16 76] Slurred speech       ED Disposition     ED Disposition Condition Date/Time Comment    Admit Stable Thu Apr 1, 2021  1:46 PM Case was discussed with Dr Judi Mendieta and the patient's admission status was agreed to be to the service of Dr Deniece Holter    None         Patient's Medications    No medications on file     No discharge procedures on file      PDMP Review     None          ED Provider  Electronically Signed by           Lazarus Christine, MD  04/01/21 1386 [facility] Alberto Wyatt MD  04/01/21 [id number] [facility] Alberto Wyatt MD  04/01/21 [id number]

## 2021-04-01 NOTE — ASSESSMENT & PLAN NOTE
· Patient presented with creatinine of 1 53 with unknown baseline (in October 2020, patient was here with creatinine of 1 7)  · Patient has not seen any provider in the last 10 years and has not been on any maintenance medications  · Patient unaware of any other comorbidities  · AKA protocol  · I&Os  · Urine studies  · Kidney and bladder sono  · Monitor creatinine  · Avoid nephrotoxins  · Avoid hypotensive episodes

## 2021-04-01 NOTE — H&P
[address]  H&P- Juan Carlos Villareal 1970, 45 y o  male MRN: [id number]  Unit/Bed#: -01 Encounter: [id number]  Primary Care Provider: No primary care provider on file  Date and time admitted to hospital: 4/1/2021 12:23 PM    * Hypertensive urgency  Assessment & Plan  · Patient presenting with BP in 200s/100s  · No known history of HTN however has not seen a physician in >10 years  · Never been on any anti-hypertensive medications  · Patient states that he has been having weakness and slurred speech associated over the last 3 weeks  · No focal deficits on exam  · Patient also admits to methamphetamine use as well as marijuana - denies recent use however may contribute to his BP  · utox ordered  · Mynor Allison as follows     "No evidence of acute infarct or intracranial hemorrhage    Patchy areas of microvascular ischemic change with small lacunar infarcts    Asymmetric right transverse and sigmoid sinus commonly can be a normal variant    If there is significant posterior fossa symptomatology clinically, further imaging assessment could be performed "    · BP still elevated despite hydralazine  · Will order prn labetalol and start patient on amlodipine 10 mg daily    Elevated troponin level not due to acute coronary syndrome  Assessment & Plan  · Troponin elevated to 0 72 on admission, denies any chest pain however does have shortness of breath  · Troponin elevation in setting of significant hypertension, possibly secondary to demand  · Will trend troponin  · Monitor patient on telemetry  · Obtain TTE given patient's history of shortness of breath and intermittent leg swelling  · Serial EKG    Acute kidney [facility])  Assessment & Plan  · Patient presented with creatinine of 1 53 with unknown baseline (in October 2020, patient was here with creatinine of 1 7)  · Patient has not seen any provider in the last 10 years and has not been on any maintenance medications  · Patient unaware of any other comorbidities  · AKA protocol  · I&Os  · Urine studies  · Kidney and bladder sono  · Monitor creatinine  · Avoid nephrotoxins  · Avoid hypotensive episodes    Leukocytosis  Assessment & Plan  · Unclear etiology  · Will continue to monitor  · No infectious signs at this time    Tobacco use  Assessment & Plan  · Admits to heavy smoking - 36 pack year smoker   · Offered nicotine patches however patient refused  · Counseled on smoking cessation    Drug use  Assessment & Plan  · Patient admits to using meth and marijuana  · Denies recent use  · utox ordered      VTE Prophylaxis: Enoxaparin (Lovenox)  / sequential compression device   Code Status:  Full code  POLST: There is no POLST form on file for this patient (pre-hospital)  Discussion with family:  Patient's sister    Anticipated Length of Stay:  Patient will be admitted on an Inpatient basis with an anticipated length of stay of  more than 2 midnights  Justification for Hospital Stay:  Hypertensive urgency, SRIRAM, cardiac workup    Total Time for Visit, including Counseling / Coordination of Care: 45 minutes  Greater than 50% of this total time spent on direct patient counseling and coordination of care  Chief Complaint:   Unsteady gait and slurred speech    History of Present Illness:    Katherine Guevara is a 45 y o  male who presents with unsteady gait and slurred speech that the patient had said had been going on for over 3 weeks now  The patient endorses that he symptoms have also been associated with shortness of breath  Patient states that his symptoms are sporadic not necessarily associated to any form of activity  The patient denies any blurring of vision, extremity weakness, numbness, tingling  Patient endorsed associated intermittent headaches as well  The patient is unable to describe what worsens his symptoms  The patient denies any chest pain, lightheadedness    The patient denies any fevers, chills, recent cough, colds, sick contacts or recent travel  The patient denies any abdominal pain, diarrhea  The patient has not seen any doctor in the last 10 years and does not know of any other comorbidities  Review of Systems:    Review of Systems   Constitutional: Negative for chills and fever  HENT: Negative for ear pain and sore throat  Eyes: Negative for pain and visual disturbance  Respiratory: Positive for shortness of breath  Negative for cough  Cardiovascular: Negative for chest pain and palpitations  Gastrointestinal: Negative for abdominal pain and vomiting  Genitourinary: Negative for dysuria and hematuria  Musculoskeletal: Negative for arthralgias and back pain  Skin: Negative for color change and rash  Neurological: Positive for speech difficulty  Negative for seizures and syncope  All other systems reviewed and are negative  Past Medical and Surgical History:     Past Medical History:   Diagnosis Date    Gout     Hypertension        History reviewed  No pertinent surgical history  Meds/Allergies:    Prior to Admission medications    Not on File     I have reviewed home medications with patient personally      Allergies: No Known Allergies    Social History:     Marital Status:    Occupation:  Unemployed  Patient Pre-hospital Living Situation:  Lives with a friend  Patient Pre-hospital Level of Mobility:  Ambulates without assistance and independent in all ADLs  Patient Pre-hospital Diet Restrictions:  None  Substance Use History:   Social History     Substance and Sexual Activity   Alcohol Use Yes     Social History     Tobacco Use   Smoking Status Current Some Day Smoker   Smokeless Tobacco Never Used     Social History     Substance and Sexual Activity   Drug Use Yes    Types: Marijuana       Family History:    Father - diabetes    Physical Exam:     Vitals:   Blood Pressure: (!) 189/109 (04/01/21 1827)  Pulse: 66 (04/01/21 1827)  Temperature: 98 °F (36 7 °C) (04/01/21 1718)  Temp Source: Oral (04/01/21 1718)  Respirations: (!) 23 (04/01/21 1827)  Height: 5' 10" (177 8 cm) (04/01/21 1227)  Weight - Scale: 100 kg (221 lb 5 5 oz) (04/01/21 1227)  SpO2: 97 % (04/01/21 1827)    Physical Exam  Vitals signs and nursing note reviewed  Constitutional:       Appearance: He is well-developed  HENT:      Head: Normocephalic and atraumatic  Eyes:      Conjunctiva/sclera: Conjunctivae normal    Neck:      Musculoskeletal: Neck supple  Cardiovascular:      Rate and Rhythm: Normal rate and regular rhythm  Heart sounds: No murmur  Pulmonary:      Effort: Pulmonary effort is normal  No respiratory distress  Breath sounds: Normal breath sounds  Abdominal:      Palpations: Abdomen is soft  Tenderness: There is no abdominal tenderness  Skin:     General: Skin is warm and dry  Neurological:      General: No focal deficit present  Mental Status: He is alert and oriented to person, place, and time  Cranial Nerves: No cranial nerve deficit  Additional Data:     Lab Results: I have personally reviewed pertinent reports  Results from last 7 days   Lab Units 04/01/21  1854 04/01/21  1231   WBC Thousand/uL  --  12 40*   HEMOGLOBIN g/dL  --  16 9   HEMATOCRIT %  --  49 2   PLATELETS Thousands/uL 296 324   NEUTROS PCT %  --  71   LYMPHS PCT %  --  19   MONOS PCT %  --  7   EOS PCT %  --  1     Results from last 7 days   Lab Units 04/01/21  1231   SODIUM mmol/L 140   POTASSIUM mmol/L 3 6   CHLORIDE mmol/L 99*   CO2 mmol/L 33*   BUN mg/dL 17   CREATININE mg/dL 1 53*   ANION GAP mmol/L 8   CALCIUM mg/dL 9 3   ALBUMIN g/dL 3 8   TOTAL BILIRUBIN mg/dL 0 47   ALK PHOS U/L 84   ALT U/L 32   AST U/L 18   GLUCOSE RANDOM mg/dL 100     Results from last 7 days   Lab Units 04/01/21  1231   INR  0 89             Results from last 7 days   Lab Units 04/01/21  1231   LACTIC ACID mmol/L 1 3       Imaging: I have personally reviewed pertinent reports        CT head without contrast   Final Result by Wendi Mcgowan MD (04/01 1521)      No evidence of acute infarct or intracranial hemorrhage  Patchy areas of microvascular ischemic change with small lacunar infarcts  Asymmetric right transverse and sigmoid sinus commonly can be a normal variant  If there is significant posterior fossa symptomatology clinically, further imaging assessment could be performed  Results were discussed with Dr Nikita Johnson performed: [id number]         XR chest 1 view portable   Final Result by Wendi Mcgowan MD (04/01 1502)      No acute cardiopulmonary disease  Workstation performed: [id number]         US kidney and bladder    (Results Pending)   MRI inpatient order    (Results Pending)       EKG, Pathology, and Other Studies Reviewed on Admission:   · EKG:  Reviewed    Allscripts / Epic Records Reviewed: Yes     ** Please Note: This note has been constructed using a voice recognition system   **

## 2021-04-02 ENCOUNTER — APPOINTMENT (INPATIENT)
Dept: NON INVASIVE DIAGNOSTICS | Facility: HOSPITAL | Age: 51
DRG: 045 | End: 2021-04-02
Payer: COMMERCIAL

## 2021-04-02 ENCOUNTER — APPOINTMENT (INPATIENT)
Dept: ULTRASOUND IMAGING | Facility: HOSPITAL | Age: 51
DRG: 045 | End: 2021-04-02
Payer: COMMERCIAL

## 2021-04-02 ENCOUNTER — APPOINTMENT (INPATIENT)
Dept: MRI IMAGING | Facility: HOSPITAL | Age: 51
DRG: 045 | End: 2021-04-02
Payer: COMMERCIAL

## 2021-04-02 PROBLEM — E78.5 HYPERLIPIDEMIA: Status: ACTIVE | Noted: 2021-04-02

## 2021-04-02 PROBLEM — I63.9 ACUTE CVA (CEREBROVASCULAR ACCIDENT) (HCC): Status: ACTIVE | Noted: 2021-04-02

## 2021-04-02 LAB
ALBUMIN SERPL BCP-MCNC: 3.3 G/DL (ref 3.5–5)
ALP SERPL-CCNC: 65 U/L (ref 46–116)
ALT SERPL W P-5'-P-CCNC: 33 U/L (ref 12–78)
ANION GAP SERPL CALCULATED.3IONS-SCNC: 7 MMOL/L (ref 4–13)
AST SERPL W P-5'-P-CCNC: 19 U/L (ref 5–45)
BASOPHILS # BLD AUTO: 0.08 THOUSANDS/ΜL (ref 0–0.1)
BASOPHILS NFR BLD AUTO: 1 % (ref 0–1)
BILIRUB SERPL-MCNC: 0.76 MG/DL (ref 0.2–1)
BUN SERPL-MCNC: 17 MG/DL (ref 5–25)
CALCIUM ALBUM COR SERPL-MCNC: 9.3 MG/DL (ref 8.3–10.1)
CALCIUM SERPL-MCNC: 8.7 MG/DL (ref 8.3–10.1)
CHLORIDE SERPL-SCNC: 102 MMOL/L (ref 100–108)
CO2 SERPL-SCNC: 29 MMOL/L (ref 21–32)
CREAT SERPL-MCNC: 1.38 MG/DL (ref 0.6–1.3)
EOSINOPHIL # BLD AUTO: 0.19 THOUSAND/ΜL (ref 0–0.61)
EOSINOPHIL NFR BLD AUTO: 2 % (ref 0–6)
ERYTHROCYTE [DISTWIDTH] IN BLOOD BY AUTOMATED COUNT: 13.2 % (ref 11.6–15.1)
GFR SERPL CREATININE-BSD FRML MDRD: 59 ML/MIN/1.73SQ M
GLUCOSE SERPL-MCNC: 116 MG/DL (ref 65–140)
HCT VFR BLD AUTO: 46.5 % (ref 36.5–49.3)
HGB BLD-MCNC: 15.8 G/DL (ref 12–17)
IMM GRANULOCYTES # BLD AUTO: 0.06 THOUSAND/UL (ref 0–0.2)
IMM GRANULOCYTES NFR BLD AUTO: 1 % (ref 0–2)
LYMPHOCYTES # BLD AUTO: 3.51 THOUSANDS/ΜL (ref 0.6–4.47)
LYMPHOCYTES NFR BLD AUTO: 35 % (ref 14–44)
MAGNESIUM SERPL-MCNC: 2 MG/DL (ref 1.6–2.6)
MCH RBC QN AUTO: 30.6 PG (ref 26.8–34.3)
MCHC RBC AUTO-ENTMCNC: 34 G/DL (ref 31.4–37.4)
MCV RBC AUTO: 90 FL (ref 82–98)
MONOCYTES # BLD AUTO: 0.9 THOUSAND/ΜL (ref 0.17–1.22)
MONOCYTES NFR BLD AUTO: 9 % (ref 4–12)
NEUTROPHILS # BLD AUTO: 5.35 THOUSANDS/ΜL (ref 1.85–7.62)
NEUTS SEG NFR BLD AUTO: 52 % (ref 43–75)
NRBC BLD AUTO-RTO: 0 /100 WBCS
PHOSPHATE SERPL-MCNC: 3.5 MG/DL (ref 2.7–4.5)
PLATELET # BLD AUTO: 297 THOUSANDS/UL (ref 149–390)
PMV BLD AUTO: 9.6 FL (ref 8.9–12.7)
POTASSIUM SERPL-SCNC: 3.6 MMOL/L (ref 3.5–5.3)
PROT SERPL-MCNC: 6.6 G/DL (ref 6.4–8.2)
RBC # BLD AUTO: 5.16 MILLION/UL (ref 3.88–5.62)
SODIUM SERPL-SCNC: 138 MMOL/L (ref 136–145)
TROPONIN I SERPL-MCNC: 0.46 NG/ML
WBC # BLD AUTO: 10.09 THOUSAND/UL (ref 4.31–10.16)

## 2021-04-02 PROCEDURE — 99232 SBSQ HOSP IP/OBS MODERATE 35: CPT | Performed by: STUDENT IN AN ORGANIZED HEALTH CARE EDUCATION/TRAINING PROGRAM

## 2021-04-02 PROCEDURE — 93005 ELECTROCARDIOGRAM TRACING: CPT

## 2021-04-02 PROCEDURE — 76770 US EXAM ABDO BACK WALL COMP: CPT

## 2021-04-02 PROCEDURE — 83735 ASSAY OF MAGNESIUM: CPT | Performed by: STUDENT IN AN ORGANIZED HEALTH CARE EDUCATION/TRAINING PROGRAM

## 2021-04-02 PROCEDURE — 70551 MRI BRAIN STEM W/O DYE: CPT

## 2021-04-02 PROCEDURE — 84484 ASSAY OF TROPONIN QUANT: CPT | Performed by: STUDENT IN AN ORGANIZED HEALTH CARE EDUCATION/TRAINING PROGRAM

## 2021-04-02 PROCEDURE — 93306 TTE W/DOPPLER COMPLETE: CPT | Performed by: INTERNAL MEDICINE

## 2021-04-02 PROCEDURE — G1004 CDSM NDSC: HCPCS

## 2021-04-02 PROCEDURE — 84100 ASSAY OF PHOSPHORUS: CPT | Performed by: STUDENT IN AN ORGANIZED HEALTH CARE EDUCATION/TRAINING PROGRAM

## 2021-04-02 PROCEDURE — 85025 COMPLETE CBC W/AUTO DIFF WBC: CPT | Performed by: STUDENT IN AN ORGANIZED HEALTH CARE EDUCATION/TRAINING PROGRAM

## 2021-04-02 PROCEDURE — 93306 TTE W/DOPPLER COMPLETE: CPT

## 2021-04-02 PROCEDURE — 80053 COMPREHEN METABOLIC PANEL: CPT | Performed by: STUDENT IN AN ORGANIZED HEALTH CARE EDUCATION/TRAINING PROGRAM

## 2021-04-02 RX ORDER — AMLODIPINE BESYLATE 10 MG/1
10 TABLET ORAL DAILY
Status: DISCONTINUED | OUTPATIENT
Start: 2021-04-03 | End: 2021-04-06 | Stop reason: HOSPADM

## 2021-04-02 RX ORDER — ASPIRIN 81 MG/1
81 TABLET, CHEWABLE ORAL DAILY
Status: DISCONTINUED | OUTPATIENT
Start: 2021-04-02 | End: 2021-04-02 | Stop reason: SDUPTHER

## 2021-04-02 RX ORDER — ASPIRIN 81 MG/1
81 TABLET ORAL DAILY
Status: DISCONTINUED | OUTPATIENT
Start: 2021-04-02 | End: 2021-04-06 | Stop reason: HOSPADM

## 2021-04-02 RX ORDER — ATORVASTATIN CALCIUM 40 MG/1
40 TABLET, FILM COATED ORAL EVERY EVENING
Status: DISCONTINUED | OUTPATIENT
Start: 2021-04-02 | End: 2021-04-06 | Stop reason: HOSPADM

## 2021-04-02 RX ORDER — LABETALOL 200 MG/1
200 TABLET, FILM COATED ORAL EVERY 12 HOURS SCHEDULED
Status: DISCONTINUED | OUTPATIENT
Start: 2021-04-02 | End: 2021-04-02

## 2021-04-02 RX ORDER — ATORVASTATIN CALCIUM 20 MG/1
20 TABLET, FILM COATED ORAL
Status: DISCONTINUED | OUTPATIENT
Start: 2021-04-02 | End: 2021-04-02

## 2021-04-02 RX ADMIN — ASPIRIN 81 MG: 81 TABLET, COATED ORAL at 17:30

## 2021-04-02 RX ADMIN — LABETALOL 20 MG/4 ML (5 MG/ML) INTRAVENOUS SYRINGE 10 MG: at 05:57

## 2021-04-02 RX ADMIN — ATORVASTATIN CALCIUM 40 MG: 40 TABLET, FILM COATED ORAL at 18:23

## 2021-04-02 RX ADMIN — LABETALOL 20 MG/4 ML (5 MG/ML) INTRAVENOUS SYRINGE 10 MG: at 18:27

## 2021-04-02 RX ADMIN — AMLODIPINE BESYLATE 10 MG: 10 TABLET ORAL at 09:13

## 2021-04-02 NOTE — PROGRESS NOTES
[address]  Progress Note Chela Mullins 1970, 45 y o  male MRN: [id number]  Unit/Bed#: -01 Encounter: [id number]  Primary Care Provider: No primary care provider on file  Date and time admitted to hospital: 4/1/2021 12:23 PM    Acute CVA (cerebrovascular accident) [facility])  Assessment & Plan  Patient presenting to the ED with 3-week history of slurred speech and gait disturbance  On presentation, patient with hypertensive urgency as well as SRIRAM  CTH negative for acute inchemia however showing multiple lacunar infarcts  MRI obtained - results significant for acute embolic infarcts  Stroke pathway initiated  Patient was already started on statin for elevated ascvd risk however increased dose to 40  Start patient on aspirin  Neuro checks  Neuro consult placed, recs appreciated  -"Given age needs CHRISTIANNE  - possible loop  - hypercoagulable panel   - CT C/A/P with/without contrast to assess for any maligany and b/l LE dopplers "      PT/OT/Speech       Normalize BP  * Hypertensive urgency  Assessment & Plan  · Patient presenting with BP in 200s/100s  · No known history of HTN however has not seen a physician in >10 years  · Never been on any anti-hypertensive medications  · Patient states that he has been having weakness and slurred speech associated over the last 3 weeks  · No focal deficits on exam  · Patient also admits to methamphetamine use as well as marijuana - denies recent use however may contribute to his BP  · utox ordered  · Mynor Allison as follows     "No evidence of acute infarct or intracranial hemorrhage    Patchy areas of microvascular ischemic change with small lacunar infarcts    Asymmetric right transverse and sigmoid sinus commonly can be a normal variant    If there is significant posterior fossa symptomatology clinically, further imaging assessment could be performed "    · BP still elevated despite hydralazine  · Will order prn labetalol and start patient on amlodipine 10 mg daily  · Blood pressure today significantly IMPROVED on the amlodipine  · TTE significant for left ventricular hypertrophy likely secondary to long-standing uncontrolled hypertension    Elevated troponin level not due to acute coronary syndrome  Assessment & Plan  · Troponin elevated to 0 72 on admission, denies any chest pain however does have shortness of breath  · Troponin elevation in setting of significant hypertension, possibly secondary to demand  · Will trend troponin - increased to 0 8 however now 0 4   · Monitor patient on telemetry - no events  · TTE showing normal EF and concentric LVH likely secondary to longstanding HTN  · Given embolic strokes, will start patient on Atorvastatin 40 mg qhs  · Serial EKG - non-ischemic    Acute kidney injury [facility])  Assessment & Plan  · Patient presented with creatinine of 1 53 with unknown baseline (in October 2020, patient was here with creatinine of 1 7)  · Patient has not seen any provider in the last 10 years and has not been on any maintenance medications  · Patient unaware of any other comorbidities  · SRIRAM protocol  · I&Os  · Kidney and bladder sono - normal  · Monitor creatinine - IMPROVING  · Avoid nephrotoxins  · Avoid hypotensive episodes    Leukocytosis  Assessment & Plan  · Unclear etiology  · Will continue to monitor  · No infectious signs at this time    RESOLVED    Hyperlipidemia  Assessment & Plan  · Will start patient on Atorvastatin 20 mg qhs as patient's calculated ASCVD Risk is 18 4%   · Lipid panel reviewed    Tobacco use  Assessment & Plan  · Admits to heavy smoking - 36 pack year smoker   · Offered nicotine patches however patient refused  · Counseled on smoking cessation    Drug use  Assessment & Plan  · Patient admits to using meth and marijuana  · Denies recent use  · utox ordered      VTE Pharmacologic Prophylaxis:   Pharmacologic: Heparin  Mechanical VTE Prophylaxis in Place: Yes    Patient Centered Rounds: I have performed bedside rounds with nursing staff today  Discussions with Specialists or Other Care Team Provider: none    Education and Discussions with Family / Patient: patient and sister    Time Spent for Care: 30 minutes  More than 50% of total time spent on counseling and coordination of care as described above  Current Length of Stay: 1 day(s)    Current Patient Status: Inpatient   Certification Statement: The patient will continue to require additional inpatient hospital stay due to medication titration    Discharge Plan: home    Code Status: Level 1 - Full Code      Subjective:   Patient seen and examined at bedside  No events overnight  Patient denies any weakness, headache, blurring of vision, lightheadedness  Patient denies any chest pain, shortness of breath  Review of Systems   Constitutional: Negative for chills and fever  HENT: Negative for ear pain and sore throat  Eyes: Negative for pain and visual disturbance  Respiratory: Negative for cough and shortness of breath  Cardiovascular: Negative for chest pain and palpitations  Gastrointestinal: Negative for abdominal pain and vomiting  Genitourinary: Negative for dysuria and hematuria  Musculoskeletal: Negative for arthralgias and back pain  Skin: Negative for color change and rash  Neurological: Negative for seizures and syncope  All other systems reviewed and are negative  Objective:     Vitals:   Temp (24hrs), Av °F (37 2 °C), Min:97 9 °F (36 6 °C), Max:100 2 °F (37 9 °C)    Temp:  [97 9 °F (36 6 °C)-100 2 °F (37 9 °C)] 98 4 °F (36 9 °C)  HR:  [59-70] 64  Resp:  [18-37] 23  BP: (136-181)/() 163/92  SpO2:  [94 %-98 %] 96 %  Body mass index is 31 89 kg/m²  Input and Output Summary (last 24 hours):        Intake/Output Summary (Last 24 hours) at 2021  Last data filed at 2021  Gross per 24 hour   Intake 600 ml   Output --   Net 600 ml       Physical Exam:     Physical Exam  Vitals signs and nursing note reviewed  Constitutional:       Appearance: He is well-developed  HENT:      Head: Normocephalic and atraumatic  Eyes:      Conjunctiva/sclera: Conjunctivae normal    Neck:      Musculoskeletal: Neck supple  Cardiovascular:      Rate and Rhythm: Normal rate and regular rhythm  Heart sounds: No murmur  Pulmonary:      Effort: Pulmonary effort is normal  No respiratory distress  Breath sounds: Normal breath sounds  Abdominal:      Palpations: Abdomen is soft  Tenderness: There is no abdominal tenderness  Skin:     General: Skin is warm and dry  Neurological:      General: No focal deficit present  Mental Status: He is alert and oriented to person, place, and time  Cranial Nerves: No cranial nerve deficit  Motor: No weakness  Additional Data:     Labs:    Results from last 7 days   Lab Units 04/02/21  0603   WBC Thousand/uL 10 09   HEMOGLOBIN g/dL 15 8   HEMATOCRIT % 46 5   PLATELETS Thousands/uL 297   NEUTROS PCT % 52   LYMPHS PCT % 35   MONOS PCT % 9   EOS PCT % 2     Results from last 7 days   Lab Units 04/02/21  0603   SODIUM mmol/L 138   POTASSIUM mmol/L 3 6   CHLORIDE mmol/L 102   CO2 mmol/L 29   BUN mg/dL 17   CREATININE mg/dL 1 38*   ANION GAP mmol/L 7   CALCIUM mg/dL 8 7   ALBUMIN g/dL 3 3*   TOTAL BILIRUBIN mg/dL 0 76   ALK PHOS U/L 65   ALT U/L 33   AST U/L 19   GLUCOSE RANDOM mg/dL 116     Results from last 7 days   Lab Units 04/01/21  1231   INR  0 89         Results from last 7 days   Lab Units 04/01/21  1854   HEMOGLOBIN A1C % 5 8*     Results from last 7 days   Lab Units 04/01/21  1231   LACTIC ACID mmol/L 1 3           * I Have Reviewed All Lab Data Listed Above  * Additional Pertinent Lab Tests Reviewed:  [address] Admission Reviewed    Imaging:    Reviewed    Recent Cultures (last 7 days):           Last 24 Hours Medication List:   Current Facility-Administered Medications   Medication Dose Route Frequency Provider Last Rate    [START ON 4/3/2021] amLODIPine  10 mg Oral Daily Geo Cedeño MD      aspirin  81 mg Oral Daily Geo Cedeño MD      atorvastatin  40 mg Oral QPM Geo Cedeño MD      heparin (porcine)  5,000 Units Subcutaneous [facility] [address] Geo Quiet Ellan Olszewski Pena-Teotico, MD      Labetalol HCl  10 mg Intravenous Q6H PRN Geo Cedeño MD          Today, Patient Was Seen By: Jeramie Root MD    ** Please Note: Dictation voice to text software may have been used in the creation of this document   **

## 2021-04-02 NOTE — ASSESSMENT & PLAN NOTE
· Will start patient on Atorvastatin 40 mg qhs as patient's calculated ASCVD Risk is 18 4%   · Lipid panel reviewed

## 2021-04-02 NOTE — ASSESSMENT & PLAN NOTE
· Patient presented with creatinine of 1 53 with unknown baseline (in October 2020, patient was here with creatinine of 1 7)  · Patient has not seen any provider in the last 10 years and has not been on any maintenance medications  · Patient unaware of any other comorbidities  · SRIRAM protocol  · I&Os  · Kidney and bladder sono - normal  · Monitor creatinine - IMPROVING  · Avoid nephrotoxins  · Avoid hypotensive episodes Routine  care and anticipatory guidance/other

## 2021-04-02 NOTE — ASSESSMENT & PLAN NOTE
· Patient presenting with BP in 200s/100s  · No known history of HTN however has not seen a physician in >10 years  · Never been on any anti-hypertensive medications  · Patient states that he has been having weakness and slurred speech associated over the last 3 weeks  · No focal deficits on exam  · Patient also admits to methamphetamine use as well as marijuana - denies recent use however may contribute to his BP  · utox ordered  · [facility] as follows     "No evidence of acute infarct or intracranial hemorrhage    Patchy areas of microvascular ischemic change with small lacunar infarcts    Asymmetric right transverse and sigmoid sinus commonly can be a normal variant    If there is significant posterior fossa symptomatology clinically, further imaging assessment could be performed "    · BP still elevated despite hydralazine  · Will order prn labetalol and start patient on amlodipine 10 mg daily  · Blood pressure today significantly IMPROVED on the amlodipine  · TTE significant for left ventricular hypertrophy likely secondary to long-standing uncontrolled hypertension

## 2021-04-02 NOTE — ASSESSMENT & PLAN NOTE
· Patient presenting with BP in 200s/100s  · No known history of HTN however has not seen a physician in >10 years  · Never been on any anti-hypertensive medications  · Patient states that he has been having weakness and slurred speech associated over the last 3 weeks  · No focal deficits on exam  · Patient also admits to methamphetamine use as well as marijuana - denies recent use however may contribute to his BP  · utox ordered  · [facility] as follows     "No evidence of acute infarct or intracranial hemorrhage    Patchy areas of microvascular ischemic change with small lacunar infarcts    Asymmetric right transverse and sigmoid sinus commonly can be a normal variant    If there is significant posterior fossa symptomatology clinically, further imaging assessment could be performed "    · BP still elevated despite hydralazine  · Patient's BP up and down on amlodipine 10 mg daily  · Added hydralazine 25 mg TID as patient cannot be started on ACEi/ARB in light of SRIRAM/CKD and also avoiding diuretics and hydrating prior to CT scan with contrast for embolic stroke work-up  · TTE significant for left ventricular hypertrophy likely secondary to long-standing uncontrolled hypertension

## 2021-04-02 NOTE — ASSESSMENT & PLAN NOTE
· Will start patient on Atorvastatin 20 mg qhs as patient's calculated ASCVD Risk is 18 4%   · Lipid panel reviewed

## 2021-04-02 NOTE — PROGRESS NOTES
Chart reviewed  Admit with hypertensive urgency  Scheduled Meds: IV labetalol        CM met with patient at the bedside,baseline information  was obtained  CM discussed the role of CM in helping the patient develop a discharge plan and assist the patient in carry out their plan  04/02/21 1049   Patient Information   Mental Status Alert   Primary Caregiver Self   Support System Friends   Activities of Daily Living Prior to Admission   Functional Status Independent   Assistive Device No device needed   Living Arrangement House;Lives with someone   [address] [facility] Unemployed   Means of Transportation   Means of Transport to Appts: Drive Self       Patient has identified: his sister Ashley Rush as his primary   No POA: No advance directive: At this time patient is not interested in receiving information  Pt verbalized Ashley Rush would be the person assisting with decisions with making if need be  PCP: None             Pt Does NOT HAVE a prescription plan and uses [facility] in [place]        Pt is not a [place]:   Pt express he does have anxiety/ depression, however does not seek care for this- denies any [address] IP admission  Pt admits to Meth use and THC use  Been using Meth for 3 years  He is not interested in having any assistance with stopping,  He has never seeked treatment for this  CM offered resources in case he changed his mind, he politely declined  Pt has 2 adult children ( [address] and [place])    He is residing with his friend in a 1 story home  At [facility] [facility] set up: 1 story home  Functional level PTA: I/pta--  - unemployed and is receiving unemployment  DME use:  none  Prior use of Home Care or STR: none  Transportation: pt drives   Community Resources: none    CM reviewed No PCP and educated on [facility] health clinics ( [facility]) and [address] [place] [address] in [place]   Pt was interested in going to [place] [address] [facility]-- Information placed on AVS     No prescription plan-- CM provided Good Rx Card and a prescription saving coupon  CM will continue to follow

## 2021-04-02 NOTE — ASSESSMENT & PLAN NOTE
Patient presenting to the ED with 3-week history of slurred speech and gait disturbance  On presentation, patient with hypertensive urgency as well as SRIRAM  CTH negative for acute inchemia however showing multiple lacunar infarcts  MRI obtained - results significant for acute embolic infarcts  Stroke pathway initiated  Patient was already started on statin for elevated ascvd risk however increased dose to 40  Start patient on aspirin  Neuro checks  Neuro consult placed, recs appreciated  -"Given age needs CHRISTIANNE  - possible loop  - hypercoagulable panel   - CT C/A/P with/without contrast to assess for any maligany and b/l LE dopplers "      PT evaluated patient and patient cleared to be discharged to home once medically cleared     Normalize BP

## 2021-04-02 NOTE — ASSESSMENT & PLAN NOTE
· Troponin elevated to 0 72 on admission, denies any chest pain however does have shortness of breath  · Troponin elevation in setting of significant hypertension, possibly secondary to demand  · Will trend troponin - increased to 0 8 however now 0 4   · Monitor patient on telemetry - no events  · TTE showing normal EF and concentric LVH likely secondary to longstanding HTN  · Given CVA, patient started on Atorvastatin 40 mg qhs  · Serial EKG - non-ischemic

## 2021-04-02 NOTE — UTILIZATION REVIEW
Initial Clinical Review    Admission: Date/Time/Statement:   Admission Orders (From admission, onward)     Ordered        04/01/21 1347  Inpatient Admission  Once                   Orders Placed This Encounter   Procedures    Inpatient Admission     Standing Status:   Standing     Number of Occurrences:   1     Order Specific Question:   Level of Care     Answer:   Med Surg [16]     Order Specific Question:   Estimated length of stay     Answer:   More than 2 Midnights     Order Specific Question:   Certification     Answer:   I certify that inpatient services are medically necessary for this patient for a duration of greater than two midnights  See H&P and MD Progress Notes for additional information about the patient's course of treatment  ED Arrival Information     Expected Arrival Acuity Means of Arrival Escorted By Service Admission Type    - 4/1/2021 12:20 Emergent Walk-In [facility] Emergency    Arrival Complaint    slurring words, bp 230/133, coordination is off        Chief Complaint   Patient presents with    Hypertension     Pt reports slurred speech and off balance for the past 2-3 weeks  Pt went to sisters home today and took his BP while there which it was 230/133     Assessment/Plan: 45year old male to the ED from home with complaints of feeling off balance, slurred speech for 3 weeks prior to arrival  Admitted to inpatient for hypertensive urgency, elevated troponin, gracie  Arrives with  Very elevated bp  Given IV hydralazine, IV metoprolol and catapres po in the ED  CT head shows small lacunar infarcts  Start amlodipine and prn labetalol  GCS 15  Troponin elevated likely in the setting of htn  Continue to trend  Monitor tele  Check ECHO  Creat on arrival is 1 53 with unknown baseline     ED Triage Vitals   Temperature Pulse Respirations Blood Pressure SpO2   04/01/21 1224 04/01/21 1224 04/01/21 1224 04/01/21 1224 04/01/21 1224   97 6 °F (36 4 °C) 100 19 (!) 232/142 99 % Temp Source Heart Rate Source Patient Position - Orthostatic VS BP Location FiO2 (%)   04/01/21 1224 04/01/21 1224 04/01/21 1224 04/01/21 1224 --   Temporal Monitor Lying Right arm       Pain Score       04/01/21 1800       No Pain          Wt Readings from Last 1 Encounters:   04/02/21 101 kg (222 lb 3 6 oz)     Additional Vital Signs:   Time  Temp Pulse Resp  BP  MAP (mmHg)  SpO2  O2 Device Patient Position - Orthostatic VS   04/02/21 0900  -- 59 18  158/92  117  95 %  None (Room air) Lying   04/02/21 0700  98 9 °F (37 2 °C) 63 18  150/81  108  95 %  None (Room air) Lying   04/02/21 0554  -- 65 20  181/102Abnormal   133  94 %  None (Room air) Lying   04/02/21 0200  -- 67 18  --  --  96 %  -- --   04/02/21 0005  97 9 °F (36 6 °C) 59 19  177/101Abnormal   133  97 %  None (Room air) Lying   04/01/21 2000  -- -- --  --  --  --  None (Room air) --   04/01/21 1827  -- 66 23Abnormal   189/109Abnormal   143  97 %  -- --   04/01/21 1718  98 °F (36 7 °C) 71 20  192/112Abnormal   145  99 %  None (Room air) Sitting   04/01/21 1630  -- 69 20  191/109Abnormal   145  96 %  None (Room air) Lying   04/01/21 1600  -- 66 20  182/103Abnormal   136  96 %  None (Room air) Lying   04/01/21 1438  -- 68 --  179/106Abnormal   --  96 %  None (Room air) Lying   04/01/21 1431  -- 71 38Abnormal   177/95Abnormal   --  95 %  None (Room air) Lying   04/01/21 1300  -- 99 33Abnormal   190/103Abnormal   140  97 %  None (Room air) Lying   04/01/21 1224  97 6 °F (36 4 °C) 100 19  232/142Abnormal   --  99 %          Pertinent Labs/Diagnostic Test Results:   4/1 EKG:  Previous ECG:  Unavailable  Interpretation:     Interpretation: non-specific    Rate:     ECG rate:  90  Rhythm:     Rhythm: sinus rhythm    Ectopy:     Ectopy: none    QRS:     QRS axis:  Normal  CT head: No evidence of acute infarct or intracranial hemorrhage  Patchy areas of microvascular ischemic change with small lacunar infarcts       Asymmetric right transverse and sigmoid sinus commonly can be a normal variant   If there is significant posterior fossa symptomatology clinically, further imaging assessment could be performed  4/1 CXR: No acute cardiopulmonary disease         Results from last 7 days   Lab Units 04/02/21  0603 04/01/21  1854 04/01/21  1231   WBC Thousand/uL 10 09  --  12 40*   HEMOGLOBIN g/dL 15 8  --  16 9   HEMATOCRIT % 46 5  --  49 2   PLATELETS Thousands/uL 297 296 324   NEUTROS ABS Thousands/µL 5 35  --  8 84*         Results from last 7 days   Lab Units 04/02/21  0603 04/01/21  1231   SODIUM mmol/L 138 140   POTASSIUM mmol/L 3 6 3 6   CHLORIDE mmol/L 102 99*   CO2 mmol/L 29 33*   ANION GAP mmol/L 7 8   BUN mg/dL 17 17   CREATININE mg/dL 1 38* 1 53*   EGFR ml/min/1 73sq m 59 52   CALCIUM mg/dL 8 7 9 3   MAGNESIUM mg/dL 2 0  --    PHOSPHORUS mg/dL 3 5  --      Results from last 7 days   Lab Units 04/02/21  0603 04/01/21  1231   AST U/L 19 18   ALT U/L 33 32   ALK PHOS U/L 65 84   TOTAL PROTEIN g/dL 6 6 7 6   ALBUMIN g/dL 3 3* 3 8   TOTAL BILIRUBIN mg/dL 0 76 0 47         Results from last 7 days   Lab Units 04/02/21  0603 04/01/21  1231   GLUCOSE RANDOM mg/dL 116 100         Results from last 7 days   Lab Units 04/01/21  1854   HEMOGLOBIN A1C % 5 8*   EAG mg/dl 120     Results from last 7 days   Lab Units 04/02/21  0918 04/01/21  2139 04/01/21  1854 04/01/21  1231   TROPONIN I ng/mL 0 46* 0 89* 0 89* 0 72*         Results from last 7 days   Lab Units 04/01/21  1231   PROTIME seconds 11 9   INR  0 89   PTT seconds 28       Results from last 7 days   Lab Units 04/01/21  1231   LACTIC ACID mmol/L 1 3       Results from last 7 days   Lab Units 04/01/21  1231   NT-PRO BNP pg/mL 514*         Results from last 7 days   Lab Units 04/01/21  1231   LIPASE u/L 88           ED Treatment:   Medication Administration from 04/01/2021 1218 to 04/01/2021 1721       Date/Time Order Dose Route Action     04/01/2021 1235 hydrALAZINE (APRESOLINE) injection 10 mg 10 mg Intravenous Given 04/01/2021 1313 metoprolol (LOPRESSOR) injection 5 mg 5 mg Intravenous Given     04/01/2021 1431 cloNIDine (CATAPRES) tablet 0 2 mg 0 2 mg Oral Given        Past Medical History:   Diagnosis Date    Gout     Hypertension        Admitting Diagnosis: Slurred speech [R47 81]  Hypertension [I10]  Elevated troponin I level [R77 8]  Hypertensive emergency [I16 1]  Age/Sex: 45 y o  male  Admission Orders:  Neuro checks  I/O  Scheduled Medications:  amLODIPine, 10 mg, Oral, Daily  atorvastatin, 20 mg, Oral, Daily With Dinner  heparin (porcine), 5,000 Units, Subcutaneous, Q8H [facility] & intermediate      Continuous IV Infusions:     PRN Meds:  Labetalol HCl, 10 mg, Intravenous, Q6H PRN        None    Network Utilization Review Department  ATTENTION: Please call with any questions or concerns to [phone] and carefully listen to the prompts so that you are directed to the right person  All voicemails are confidential   Kellen Caraballo all requests for admission clinical reviews, approved or denied determinations and any other requests to dedicated fax number below belonging to the campus where the patient is receiving treatment   List of dedicated fax numbers for the Facilities:  [address] DENIALS (Administrative/Medical Necessity) [phone]   [address] (Maternity/NICU/Pediatrics) [phone]   [address] [address] [facility] Lisette Harry [address] ([address] [address] [place] [address] [place] [address] [place]) [address] [place] [address] [phone]

## 2021-04-02 NOTE — ASSESSMENT & PLAN NOTE
· Patient presented with creatinine of 1 53 with unknown baseline (in October 2020, patient was here with creatinine of 1 7)  · Patient has not seen any provider in the last 10 years and has not been on any maintenance medications  · Patient unaware of any other comorbidities  · SRIRAM protocol  · I&Os  · Kidney and bladder sono - normal  · Monitor creatinine - IMPROVING  · Avoid nephrotoxins  · Avoid hypotensive episodes  · Nephrology consulted for optimization of renal function prior to imaging with contrast for embolic stroke work-up, recommendations appreciated - patient currently on fluids at this time and will receive a bolus prior to CT scan then will hydrate patient again after CT scan and maintain him on IVF after imaging; patient not on ACEi/ARB, patient not on any diuretics

## 2021-04-02 NOTE — PLAN OF CARE
Problem: Potential for Falls  Goal: Patient will remain free of falls  Description: INTERVENTIONS:  - Assess patient frequently for physical needs  -  Identify cognitive and physical deficits and behaviors that affect risk of falls    -  [place] fall precautions as indicated by assessment   - Educate patient/family on patient safety including physical limitations  - Instruct patient to call for assistance with activity based on assessment  - Modify environment to reduce risk of injury  - Consider OT/PT consult to assist with strengthening/mobility  Outcome: Progressing     Problem: CARDIOVASCULAR - ADULT  Goal: Maintains optimal cardiac output and hemodynamic stability  Description: INTERVENTIONS:  - Monitor I/O, vital signs and rhythm  - Monitor for S/S and trends of decreased cardiac output  - Administer and titrate ordered vasoactive medications to optimize hemodynamic stability  - Assess quality of pulses, skin color and temperature  - Assess for signs of decreased coronary artery perfusion  - Instruct patient to report change in severity of symptoms  Outcome: Progressing  Goal: Absence of cardiac dysrhythmias or at baseline rhythm  Description: INTERVENTIONS:  - Continuous cardiac monitoring, vital signs, obtain 12 lead EKG if ordered  - Administer antiarrhythmic and heart rate control medications as ordered  - Monitor electrolytes and administer replacement therapy as ordered  Outcome: Progressing     Problem: PAIN - ADULT  Goal: Verbalizes/displays adequate comfort level or baseline comfort level  Description: Interventions:  - Encourage patient to monitor pain and request assistance  - Assess pain using appropriate pain scale  - Administer analgesics based on type and severity of pain and evaluate response  - Implement non-pharmacological measures as appropriate and evaluate response  - Consider cultural and social influences on pain and pain management  - Notify physician/advanced practitioner if interventions unsuccessful or patient reports new pain  Outcome: Progressing     Problem: INFECTION - ADULT  Goal: Absence or prevention of progression during hospitalization  Description: INTERVENTIONS:  - Assess and monitor for signs and symptoms of infection  - Monitor lab/diagnostic results  - Monitor all insertion sites, i e  indwelling lines, tubes, and drains  - Monitor endotracheal if appropriate and nasal secretions for changes in amount and color  - [place] appropriate cooling/warming therapies per order  - Administer medications as ordered  - Instruct and encourage patient and family to use good hand hygiene technique  - Identify and instruct in appropriate isolation precautions for identified infection/condition  Outcome: Progressing  Goal: Absence of fever/infection during neutropenic period  Description: INTERVENTIONS:  - Monitor WBC    Outcome: Progressing     Problem: SAFETY ADULT  Goal: Patient will remain free of falls  Description: INTERVENTIONS:  - Assess patient frequently for physical needs  -  Identify cognitive and physical deficits and behaviors that affect risk of falls    -  [place] fall precautions as indicated by assessment   - Educate patient/family on patient safety including physical limitations  - Instruct patient to call for assistance with activity based on assessment  - Modify environment to reduce risk of injury  - Consider OT/PT consult to assist with strengthening/mobility  Outcome: Progressing  Goal: Maintain or return to baseline ADL function  Description: INTERVENTIONS:  -  Assess patient's ability to carry out ADLs; assess patient's baseline for ADL function and identify physical deficits which impact ability to perform ADLs (bathing, care of mouth/teeth, toileting, grooming, dressing, etc )  - Assess/evaluate cause of self-care deficits   - Assess range of motion  - Assess patient's mobility; develop plan if impaired  - Assess patient's need for assistive devices and provide as appropriate  - Encourage maximum independence but intervene and supervise when necessary  - Involve family in performance of ADLs  - Assess for home care needs following discharge   - Consider OT consult to assist with ADL evaluation and planning for discharge  - Provide patient education as appropriate  Outcome: Progressing  Goal: Maintain or return mobility status to optimal level  Description: INTERVENTIONS:  - Assess patient's baseline mobility status (ambulation, transfers, stairs, etc )    - Identify cognitive and physical deficits and behaviors that affect mobility  - Identify mobility aids required to assist with transfers and/or ambulation (gait belt, sit-to-stand, lift, walker, cane, etc )  - [place] fall precautions as indicated by assessment  - Record patient progress and toleration of activity level on Mobility SBAR; progress patient to next Phase/Stage  - Instruct patient to call for assistance with activity based on assessment  - Consider rehabilitation consult to assist with strengthening/weightbearing, etc   Outcome: Progressing     Problem: DISCHARGE PLANNING  Goal: Discharge to home or other facility with appropriate resources  Description: INTERVENTIONS:  - Identify barriers to discharge w/patient and caregiver  - Arrange for needed discharge resources and transportation as appropriate  - Identify discharge learning needs (meds, wound care, etc )  - Arrange for interpretive services to assist at discharge as needed  - Refer to Case Management Department for coordinating discharge planning if the patient needs post-hospital services based on physician/advanced practitioner order or complex needs related to functional status, cognitive ability, or social support system  Outcome: Progressing     Problem: Knowledge Deficit  Goal: Patient/family/caregiver demonstrates understanding of disease process, treatment plan, medications, and discharge instructions  Description: Complete learning assessment and assess knowledge base    Interventions:  - Provide teaching at level of understanding  - Provide teaching via preferred learning methods  Outcome: Progressing

## 2021-04-02 NOTE — QUICK NOTE
MRI brain reviewed, multifocal embolic appearing strokes  Given age needs CHRISTIANNE, possible loop, hypercoagulable panel and CT C/A/P with/without contrast to assess for any maligany and b/l LE dopplers  PT/OT/Speech  Normalize BP

## 2021-04-02 NOTE — ASSESSMENT & PLAN NOTE
· Troponin elevated to 0 72 on admission, denies any chest pain however does have shortness of breath  · Troponin elevation in setting of significant hypertension, possibly secondary to demand  · Will trend troponin - increased to 0 8 however now 0 4   · Monitor patient on telemetry - no events  · TTE showing normal EF and concentric LVH likely secondary to longstanding HTN  · Will start patient on Atorvastatin 20 mg qhs as ASCVD risk 18 4   · Serial EKG - non-ischemic

## 2021-04-03 ENCOUNTER — APPOINTMENT (INPATIENT)
Dept: CT IMAGING | Facility: HOSPITAL | Age: 51
DRG: 045 | End: 2021-04-03
Payer: COMMERCIAL

## 2021-04-03 LAB
AMPHETAMINES SERPL QL SCN: POSITIVE
ANION GAP SERPL CALCULATED.3IONS-SCNC: 7 MMOL/L (ref 4–13)
BACTERIA UR QL AUTO: ABNORMAL /HPF
BARBITURATES UR QL: NEGATIVE
BENZODIAZ UR QL: NEGATIVE
BILIRUB UR QL STRIP: NEGATIVE
BUN SERPL-MCNC: 16 MG/DL (ref 5–25)
CALCIUM SERPL-MCNC: 8.7 MG/DL (ref 8.3–10.1)
CHLORIDE SERPL-SCNC: 104 MMOL/L (ref 100–108)
CLARITY UR: CLEAR
CO2 SERPL-SCNC: 28 MMOL/L (ref 21–32)
COCAINE UR QL: NEGATIVE
COLOR UR: YELLOW
CREAT SERPL-MCNC: 1.42 MG/DL (ref 0.6–1.3)
GFR SERPL CREATININE-BSD FRML MDRD: 57 ML/MIN/1.73SQ M
GLUCOSE SERPL-MCNC: 114 MG/DL (ref 65–140)
GLUCOSE UR STRIP-MCNC: NEGATIVE MG/DL
HGB UR QL STRIP.AUTO: NEGATIVE
KETONES UR STRIP-MCNC: NEGATIVE MG/DL
LEUKOCYTE ESTERASE UR QL STRIP: NEGATIVE
METHADONE UR QL: NEGATIVE
NITRITE UR QL STRIP: NEGATIVE
NON-SQ EPI CELLS URNS QL MICRO: ABNORMAL /HPF
OPIATES UR QL SCN: NEGATIVE
OXYCODONE+OXYMORPHONE UR QL SCN: NEGATIVE
PCP UR QL: NEGATIVE
PH UR STRIP.AUTO: 6.5 [PH]
POTASSIUM SERPL-SCNC: 3.7 MMOL/L (ref 3.5–5.3)
PROT UR STRIP-MCNC: NEGATIVE MG/DL
RBC #/AREA URNS AUTO: ABNORMAL /HPF
SODIUM SERPL-SCNC: 139 MMOL/L (ref 136–145)
SP GR UR STRIP.AUTO: 1.01 (ref 1–1.03)
THC UR QL: POSITIVE
UROBILINOGEN UR QL STRIP.AUTO: 0.2 E.U./DL
WBC #/AREA URNS AUTO: ABNORMAL /HPF

## 2021-04-03 PROCEDURE — 84156 ASSAY OF PROTEIN URINE: CPT | Performed by: STUDENT IN AN ORGANIZED HEALTH CARE EDUCATION/TRAINING PROGRAM

## 2021-04-03 PROCEDURE — 80307 DRUG TEST PRSMV CHEM ANLYZR: CPT | Performed by: NURSE PRACTITIONER

## 2021-04-03 PROCEDURE — 97116 GAIT TRAINING THERAPY: CPT

## 2021-04-03 PROCEDURE — 80048 BASIC METABOLIC PNL TOTAL CA: CPT | Performed by: STUDENT IN AN ORGANIZED HEALTH CARE EDUCATION/TRAINING PROGRAM

## 2021-04-03 PROCEDURE — 74177 CT ABD & PELVIS W/CONTRAST: CPT

## 2021-04-03 PROCEDURE — 99232 SBSQ HOSP IP/OBS MODERATE 35: CPT | Performed by: STUDENT IN AN ORGANIZED HEALTH CARE EDUCATION/TRAINING PROGRAM

## 2021-04-03 PROCEDURE — 81001 URINALYSIS AUTO W/SCOPE: CPT | Performed by: STUDENT IN AN ORGANIZED HEALTH CARE EDUCATION/TRAINING PROGRAM

## 2021-04-03 PROCEDURE — 97163 PT EVAL HIGH COMPLEX 45 MIN: CPT

## 2021-04-03 PROCEDURE — 99254 IP/OBS CNSLTJ NEW/EST MOD 60: CPT | Performed by: NURSE PRACTITIONER

## 2021-04-03 PROCEDURE — G1004 CDSM NDSC: HCPCS

## 2021-04-03 PROCEDURE — 84300 ASSAY OF URINE SODIUM: CPT | Performed by: STUDENT IN AN ORGANIZED HEALTH CARE EDUCATION/TRAINING PROGRAM

## 2021-04-03 PROCEDURE — 71260 CT THORAX DX C+: CPT

## 2021-04-03 PROCEDURE — 82570 ASSAY OF URINE CREATININE: CPT | Performed by: STUDENT IN AN ORGANIZED HEALTH CARE EDUCATION/TRAINING PROGRAM

## 2021-04-03 RX ORDER — SODIUM CHLORIDE 9 MG/ML
100 INJECTION, SOLUTION INTRAVENOUS CONTINUOUS
Status: DISCONTINUED | OUTPATIENT
Start: 2021-04-03 | End: 2021-04-04

## 2021-04-03 RX ORDER — HYDRALAZINE HYDROCHLORIDE 25 MG/1
25 TABLET, FILM COATED ORAL EVERY 8 HOURS SCHEDULED
Status: DISCONTINUED | OUTPATIENT
Start: 2021-04-03 | End: 2021-04-04

## 2021-04-03 RX ORDER — LABETALOL 20 MG/4 ML (5 MG/ML) INTRAVENOUS SYRINGE
10 EVERY 6 HOURS PRN
Status: DISCONTINUED | OUTPATIENT
Start: 2021-04-03 | End: 2021-04-06 | Stop reason: HOSPADM

## 2021-04-03 RX ADMIN — HYDRALAZINE HYDROCHLORIDE 25 MG: 25 TABLET ORAL at 22:39

## 2021-04-03 RX ADMIN — SODIUM CHLORIDE 100 ML/HR: 0.9 INJECTION, SOLUTION INTRAVENOUS at 11:45

## 2021-04-03 RX ADMIN — AMLODIPINE BESYLATE 10 MG: 10 TABLET ORAL at 08:56

## 2021-04-03 RX ADMIN — LABETALOL 20 MG/4 ML (5 MG/ML) INTRAVENOUS SYRINGE 10 MG: at 11:45

## 2021-04-03 RX ADMIN — HEPARIN SODIUM 5000 UNITS: 5000 INJECTION INTRAVENOUS; SUBCUTANEOUS at 22:37

## 2021-04-03 RX ADMIN — IOHEXOL 100 ML: 350 INJECTION, SOLUTION INTRAVENOUS at 23:38

## 2021-04-03 RX ADMIN — HEPARIN SODIUM 5000 UNITS: 5000 INJECTION INTRAVENOUS; SUBCUTANEOUS at 14:52

## 2021-04-03 RX ADMIN — ATORVASTATIN CALCIUM 40 MG: 40 TABLET, FILM COATED ORAL at 18:10

## 2021-04-03 RX ADMIN — LABETALOL 20 MG/4 ML (5 MG/ML) INTRAVENOUS SYRINGE 10 MG: at 18:10

## 2021-04-03 RX ADMIN — ASPIRIN 81 MG: 81 TABLET, COATED ORAL at 08:56

## 2021-04-03 RX ADMIN — HYDRALAZINE HYDROCHLORIDE 25 MG: 25 TABLET ORAL at 16:00

## 2021-04-03 RX ADMIN — SODIUM CHLORIDE 100 ML/HR: 0.9 INJECTION, SOLUTION INTRAVENOUS at 22:39

## 2021-04-03 NOTE — UTILIZATION REVIEW
Initial Clinical Review    Admission: Date/Time/Statement:   Admission Orders (From admission, onward)     Ordered        04/01/21 1347  Inpatient Admission  Once                   Orders Placed This Encounter   Procedures    Inpatient Admission     Standing Status:   Standing     Number of Occurrences:   1     Order Specific Question:   Level of Care     Answer:   Med Surg [16]     Order Specific Question:   Estimated length of stay     Answer:   More than 2 Midnights     Order Specific Question:   Certification     Answer:   I certify that inpatient services are medically necessary for this patient for a duration of greater than two midnights  See H&P and MD Progress Notes for additional information about the patient's course of treatment  ED Arrival Information     Expected Arrival Acuity Means of Arrival Escorted By Service Admission Type    - 4/1/2021 12:20 Emergent Walk-In [facility] Emergency    Arrival Complaint    slurring words, bp 230/133, coordination is off        Chief Complaint   Patient presents with    Hypertension     Pt reports slurred speech and off balance for the past 2-3 weeks  Pt went to sisters home today and took his BP while there which it was 230/133     Assessment/Plan: 45year old male to the ED from home with complaints of feeling off balance, slurred speech for 3 weeks prior to arrival  Admitted to inpatient for hypertensive urgency, elevated troponin, gracie  Arrives with  Very elevated bp  Given IV hydralazine, IV metoprolol and catapres po in the ED  CT head shows small lacunar infarcts  Start amlodipine and prn labetalol  GCS 15  Troponin elevated likely in the setting of htn  Continue to trend  Monitor tele  Check ECHO  Creat on arrival is 1 53 with unknown baseline  4/2 Progress notes; Acute CVA, Hypertensive urgency  Start on aspirin  Neuro checks  Neurology consult  Needs CHRISTIANNE  Possible Loop  Hypercoagulable panel   BP still elevated despite hydralazine  Add order prn labetalol and start amlodipine daily and BP improving  Tele monitoring  Given embolic strokes, will start patient on Atorvastatin 40 mg qhs  Serial EKG  Creat improving       ED Triage Vitals   Temperature Pulse Respirations Blood Pressure SpO2   04/01/21 1224 04/01/21 1224 04/01/21 1224 04/01/21 1224 04/01/21 1224   97 6 °F (36 4 °C) 100 19 (!) 232/142 99 %      Temp Source Heart Rate Source Patient Position - Orthostatic VS BP Location FiO2 (%)   04/01/21 1224 04/01/21 1224 04/01/21 1224 04/01/21 1224 --   Temporal Monitor Lying Right arm       Pain Score       04/01/21 1800       No Pain          Wt Readings from Last 1 Encounters:   04/03/21 101 kg (222 lb 10 6 oz)     Additional Vital Signs:   Time  Temp Pulse Resp  BP  MAP (mmHg)  SpO2  O2 Device Patient Position - Orthostatic VS   04/02/21 0900  -- 59 18  158/92  117  95 %  None (Room air) Lying   04/02/21 0700  98 9 °F (37 2 °C) 63 18  150/81  108  95 %  None (Room air) Lying   04/02/21 0554  -- 65 20  181/102Abnormal   133  94 %  None (Room air) Lying   04/02/21 0200  -- 67 18  --  --  96 %  -- --   04/02/21 0005  97 9 °F (36 6 °C) 59 19  177/101Abnormal   133  97 %  None (Room air) Lying   04/01/21 2000  -- -- --  --  --  --  None (Room air) --   04/01/21 1827  -- 66 23Abnormal   189/109Abnormal   143  97 %  -- --   04/01/21 1718  98 °F (36 7 °C) 71 20  192/112Abnormal   145  99 %  None (Room air) Sitting   04/01/21 1630  -- 69 20  191/109Abnormal   145  96 %  None (Room air) Lying   04/01/21 1600  -- 66 20  182/103Abnormal   136  96 %  None (Room air) Lying   04/01/21 1438  -- 68 --  179/106Abnormal   --  96 %  None (Room air) Lying   04/01/21 1431  -- 71 38Abnormal   177/95Abnormal   --  95 %  None (Room air) Lying   04/01/21 1300  -- 99 33Abnormal   190/103Abnormal   140  97 %  None (Room air) Lying   04/01/21 1224  97 6 °F (36 4 °C) 100 19  232/142Abnormal   --  99 %          Pertinent Labs/Diagnostic Test Results:   4/1 EKG: Previous ECG:  Unavailable  Interpretation:     Interpretation: non-specific    Rate:     ECG rate:  90  Rhythm:     Rhythm: sinus rhythm    Ectopy:     Ectopy: none    QRS:     QRS axis:  Normal  CT head: No evidence of acute infarct or intracranial hemorrhage  Patchy areas of microvascular ischemic change with small lacunar infarcts  Asymmetric right transverse and sigmoid sinus commonly can be a normal variant   If there is significant posterior fossa symptomatology clinically, further imaging assessment could be performed  4/1 CXR: No acute cardiopulmonary disease         Results from last 7 days   Lab Units 04/02/21  0603 04/01/21  1854 04/01/21  1231   WBC Thousand/uL 10 09  --  12 40*   HEMOGLOBIN g/dL 15 8  --  16 9   HEMATOCRIT % 46 5  --  49 2   PLATELETS Thousands/uL 297 296 324   NEUTROS ABS Thousands/µL 5 35  --  8 84*         Results from last 7 days   Lab Units 04/02/21  0603 04/01/21  1231   SODIUM mmol/L 138 140   POTASSIUM mmol/L 3 6 3 6   CHLORIDE mmol/L 102 99*   CO2 mmol/L 29 33*   ANION GAP mmol/L 7 8   BUN mg/dL 17 17   CREATININE mg/dL 1 38* 1 53*   EGFR ml/min/1 73sq m 59 52   CALCIUM mg/dL 8 7 9 3   MAGNESIUM mg/dL 2 0  --    PHOSPHORUS mg/dL 3 5  --      Results from last 7 days   Lab Units 04/02/21  0603 04/01/21  1231   AST U/L 19 18   ALT U/L 33 32   ALK PHOS U/L 65 84   TOTAL PROTEIN g/dL 6 6 7 6   ALBUMIN g/dL 3 3* 3 8   TOTAL BILIRUBIN mg/dL 0 76 0 47         Results from last 7 days   Lab Units 04/02/21  0603 04/01/21  1231   GLUCOSE RANDOM mg/dL 116 100         Results from last 7 days   Lab Units 04/01/21  1854   HEMOGLOBIN A1C % 5 8*   EAG mg/dl 120     Results from last 7 days   Lab Units 04/02/21  0918 04/01/21  2139 04/01/21  1854 04/01/21  1231   TROPONIN I ng/mL 0 46* 0 89* 0 89* 0 72*         Results from last 7 days   Lab Units 04/01/21  1231   PROTIME seconds 11 9   INR  0 89   PTT seconds 28       Results from last 7 days   Lab Units 04/01/21  1231   LACTIC ACID mmol/L 1 3       Results from last 7 days   Lab Units 04/01/21  1231   NT-PRO BNP pg/mL 514*         Results from last 7 days   Lab Units 04/01/21  1231   LIPASE u/L 88           ED Treatment:   Medication Administration from 04/01/2021 1218 to 04/01/2021 1721       Date/Time Order Dose Route Action     04/01/2021 1235 hydrALAZINE (APRESOLINE) injection 10 mg 10 mg Intravenous Given     04/01/2021 1313 metoprolol (LOPRESSOR) injection 5 mg 5 mg Intravenous Given     04/01/2021 1431 cloNIDine (CATAPRES) tablet 0 2 mg 0 2 mg Oral Given        Past Medical History:   Diagnosis Date    Gout     Hypertension        Admitting Diagnosis: Slurred speech [R47 81]  Hypertension [I10]  Elevated troponin I level [R77 8]  Hypertensive emergency [I16 1]  Age/Sex: 45 y o  male  Admission Orders:  Neuro checks  I/O  Scheduled Medications:  amLODIPine, 10 mg, Oral, Daily  aspirin, 81 mg, Oral, Daily  atorvastatin, 40 mg, Oral, QPM  heparin (porcine), 5,000 Units, Subcutaneous, Q8H [facility]  hydrALAZINE, 25 mg, Oral, Q8H [facility]      Continuous IV Infusions:  sodium chloride, 100 mL/hr, Intravenous, Continuous      PRN Meds:  Labetalol HCl, 10 mg, Intravenous, Q6H PRN        IP CONSULT TO NEUROLOGY  IP CONSULT TO CASE MANAGEMENT  IP CONSULT TO NUTRITION SERVICES  IP CONSULT TO NEPHROLOGY    Network Utilization Review Department  ATTENTION: Please call with any questions or concerns to [phone] and carefully listen to the prompts so that you are directed to the right person  All voicemails are confidential   Kellen Caraballo all requests for admission clinical reviews, approved or denied determinations and any other requests to dedicated fax number below belonging to the campus where the patient is receiving treatment   List of dedicated fax numbers for the Facilities:  [address] DENIALS (Administrative/Medical Necessity) [phone]   [address] (Maternity/NICU/Pediatrics) [phone]   [address] [place] [address] Maria Eugenia Vallejo [phone]   [address] [phone]   Abdiaziz Harry [address] ([address]) [address] [place] [address] [place] [address] [phone]   [place] [address] [phone]

## 2021-04-03 NOTE — PLAN OF CARE
Problem: PHYSICAL THERAPY ADULT  Goal: Performs mobility at highest level of function for planned discharge setting  See evaluation for individualized goals  Description: Treatment/Interventions: Functional transfer training, LE strengthening/ROM, Elevations, Therapeutic exercise, Endurance training, Patient/family training, Equipment eval/education, Bed mobility, Gait training, Compensatory technique education, Spoke to nursing  Equipment Recommended: (none anticipated)       See flowsheet documentation for full assessment, interventions and recommendations  Note: Prognosis: Excellent  Problem List: Decreased strength, Decreased endurance, Impaired balance, Decreased mobility, Impaired judgement  Assessment: Pt is a 45 y o  male seen for PT evaluation s/p admission to [address] [place] [address] on 4/1/2021 with Hypertensive urgency  Order placed for PT services  Upon evaluation: Pt is presenting with impaired functional mobility due to decreased strength, impaired balance, impaired coordination, gait deviations, decreased safety awareness and fall risk requiring supervision assistance for transfers and close supervision assistance for ambulation with out AD  Pt's clinical presentation is currently unpredictable given the functional mobility deficits above, especially decreased endurance, gait deviations, decreased activity tolerance and decreased safety awareness, coupled with fall risks as indicated by AM-PAC 6-Clicks: 11/50 as well as impaired balance, polypharmacy and decreased safety awareness and combined with medical complications of hypertension , abnormal renal lab values, abnormal WBCs and abnormal potassium values, and abnormal troponin  Pt's PMHx and comorbidities that may affect physical performance and progress include: HTN and gout  Personal factors affecting pt at time of IE include: step(s) to enter environment, inability to navigate community distances, tobacco use and drug use   Pt will benefit from continued skilled PT services to address deficits as defined above and to maximize level of functional mobility to facilitate return toward PLOF and improved QOL  From PT/mobility standpoint, recommendation at time of d/c would be OP PT pending progress in order to reduce fall risk and maximize pt's functional independence and consistency with mobility in order to facilitate return to PLOF  Recommend ther ex next 1-2 sessions and balance training  Barriers to Discharge: None     PT Discharge Recommendation: Return to previous environment with social support, Other (Comment)(OPPT)     PT - OK to Discharge: (when medically cleared)    See flowsheet documentation for full assessment

## 2021-04-03 NOTE — RESPIRATORY THERAPY NOTE
RT Protocol Note  Lazaro Mcneal 45 y o  male MRN: [id number]  Unit/Bed#: -01 Encounter: [id number]    Assessment    Principal Problem:    Hypertensive urgency  Active Problems:    Acute kidney injury ([facility] [address] )    Elevated troponin level not due to acute coronary syndrome    Drug use    Tobacco use    Leukocytosis    Hyperlipidemia    Acute CVA (cerebrovascular accident) ([facility] [address] )      Home Pulmonary Medications:  Na       Past Medical History:   Diagnosis Date    Gout     Hypertension      Social History     Socioeconomic History    Marital status:      Spouse name: None    Number of children: None    Years of education: None    Highest education level: None   Occupational History    None   Social Needs    Financial resource strain: None    Food insecurity     Worry: None     Inability: None    Transportation needs     Medical: None     Non-medical: None   Tobacco Use    Smoking status: Current Some Day Smoker    Smokeless tobacco: Never Used   Substance and Sexual Activity    Alcohol use:  Yes    Drug use: Yes     Types: Marijuana    Sexual activity: Not Currently   Lifestyle    Physical activity     Days per week: None     Minutes per session: None    Stress: None   Relationships    Social connections     Talks on phone: None     Gets together: None     Attends Sikh service: None     Active member of club or organization: None     Attends meetings of clubs or organizations: None     Relationship status: None    Intimate partner violence     Fear of current or ex partner: None     Emotionally abused: None     Physically abused: None     Forced sexual activity: None   Other Topics Concern    None   Social History Narrative    None       Subjective         Objective    Physical Exam:   Assessment Type: Assess only  General Appearance: Awake, Alert  Respiratory Pattern: Normal  Chest Assessment: Chest expansion symmetrical  Bilateral Breath Sounds: Diminished  Cough: Non-productive  O2 Device: RA    Vitals:  Blood pressure 156/82, pulse 65, temperature 98 4 °F (36 9 °C), temperature source Temporal, resp  rate 17, height 5' 10" (1 778 m), weight 101 kg (222 lb 3 6 oz), SpO2 95 %  Imaging and other studies: I have personally reviewed pertinent reports  O2 Device: RA     Plan    Respiratory Plan: Discontinue Protocol       Pt admitted for hypertensive urgancy  No SOB at this time  Pt able to walk around room with out desaturation  Pt has no hx of COPD/Asthma does not take pulmonary meds  CXR reveal clear lung fields  Will D/c protocol at this time

## 2021-04-03 NOTE — PLAN OF CARE
Problem: PHYSICAL THERAPY ADULT  Goal: Performs mobility at highest level of function for planned discharge setting  See evaluation for individualized goals  Description: Treatment/Interventions: Functional transfer training, LE strengthening/ROM, Elevations, Therapeutic exercise, Endurance training, Patient/family training, Equipment eval/education, Bed mobility, Gait training, Compensatory technique education, Spoke to nursing  Equipment Recommended: (none anticipated)       See flowsheet documentation for full assessment, interventions and recommendations  7/9/0270 [id number] by Rocío Pedroza PT  Note: Prognosis: Excellent  Problem List: Decreased strength, Decreased endurance, Impaired balance, Decreased mobility, Impaired judgement  Pt requires increased time to complete mobility  He was able to ambulate increased distance, but increased path deviation and slowed adarsh  He will continue to benefit from PT services to maximize LOF with improved balance and gait  Barriers to Discharge: None     PT Discharge Recommendation: Return to previous environment with social support, Other (Comment)(OPPT)     PT - OK to Discharge: (when medically cleared)    See flowsheet documentation for full assessment

## 2021-04-03 NOTE — PROGRESS NOTES
[address]  Progress Note Geraldine Judd 1970, 45 y o  male MRN: [id number]  Unit/Bed#: -01 Encounter: [id number]  Primary Care Provider: No primary care provider on file  Date and time admitted to hospital: 4/1/2021 12:23 PM    Acute CVA (cerebrovascular accident) [facility])  Assessment & Plan  Patient presenting to the ED with 3-week history of slurred speech and gait disturbance  On presentation, patient with hypertensive urgency as well as SRIRAM  CTH negative for acute inchemia however showing multiple lacunar infarcts  MRI obtained - results significant for acute embolic infarcts  Stroke pathway initiated  Patient was already started on statin for elevated ascvd risk however increased dose to 40  Start patient on aspirin  Neuro checks  Neuro consult placed, recs appreciated  -"Given age needs CHRISTIANNE  - possible loop  - hypercoagulable panel   - CT C/A/P with/without contrast to assess for any maligany and b/l LE dopplers "      PT evaluated patient and patient cleared to be discharged to home once medically cleared     Normalize BP  * Hypertensive urgency  Assessment & Plan  · Patient presenting with BP in 200s/100s  · No known history of HTN however has not seen a physician in >10 years  · Never been on any anti-hypertensive medications  · Patient states that he has been having weakness and slurred speech associated over the last 3 weeks  · No focal deficits on exam  · Patient also admits to methamphetamine use as well as marijuana - denies recent use however may contribute to his BP  · utox ordered  · Mynor Allison as follows     "No evidence of acute infarct or intracranial hemorrhage    Patchy areas of microvascular ischemic change with small lacunar infarcts    Asymmetric right transverse and sigmoid sinus commonly can be a normal variant    If there is significant posterior fossa symptomatology clinically, further imaging assessment could be performed "    · BP still elevated despite hydralazine  · Patient's BP up and down on amlodipine 10 mg daily  · Added hydralazine 25 mg TID as patient cannot be started on ACEi/ARB in light of SRIRAM/CKD and also avoiding diuretics and hydrating prior to CT scan with contrast for embolic stroke work-up  · TTE significant for left ventricular hypertrophy likely secondary to long-standing uncontrolled hypertension    Elevated troponin level not due to acute coronary syndrome  Assessment & Plan  · Troponin elevated to 0 72 on admission, denies any chest pain however does have shortness of breath  · Troponin elevation in setting of significant hypertension, possibly secondary to demand  · Will trend troponin - increased to 0 8 however now 0 4   · Monitor patient on telemetry - no events  · TTE showing normal EF and concentric LVH likely secondary to longstanding HTN  · Given CVA, patient started on Atorvastatin 40 mg qhs  · Serial EKG - non-ischemic    Acute kidney injury [facility])  Assessment & Plan  · Patient presented with creatinine of 1 53 with unknown baseline (in October 2020, patient was here with creatinine of 1 7)  · Patient has not seen any provider in the last 10 years and has not been on any maintenance medications  · Patient unaware of any other comorbidities  · SRIRAM protocol  · I&Os  · Kidney and bladder sono - normal  · Monitor creatinine - IMPROVING  · Avoid nephrotoxins  · Avoid hypotensive episodes  · Nephrology consulted for optimization of renal function prior to imaging with contrast for embolic stroke work-up, recommendations appreciated - patient currently on fluids at this time and will receive a bolus prior to CT scan then will hydrate patient again after CT scan and maintain him on IVF after imaging; patient not on ACEi/ARB, patient not on any diuretics    Leukocytosis  Assessment & Plan  · Unclear etiology  · Will continue to monitor  · No infectious signs at this time    RESOLVED    Hyperlipidemia  Assessment & Plan  · Will start patient on Atorvastatin 40 mg qhs as patient's calculated ASCVD Risk is 18 4%   · Lipid panel reviewed    Tobacco use  Assessment & Plan  · Admits to heavy smoking - 36 pack year smoker   · Offered nicotine patches however patient refused  · Counseled on smoking cessation    Drug use  Assessment & Plan  · Patient admits to using meth and marijuana  · Denies recent use  · utox ordered      VTE Pharmacologic Prophylaxis:   Pharmacologic: Heparin  Mechanical VTE Prophylaxis in Place: Yes    Patient Centered Rounds: I have performed bedside rounds with nursing staff today  Discussions with Specialists or Other Care Team Provider: neurology, nephrology    Education and Discussions with Family / Patient: patient and sister    Time Spent for Care: 30 minutes  More than 50% of total time spent on counseling and coordination of care as described above  Current Length of Stay: 2 day(s)    Current Patient Status: Inpatient   Certification Statement: The patient will continue to require additional inpatient hospital stay due to embolic stroke work-up    Discharge Plan: home once medically cleared    Code Status: Level 1 - Full Code      Subjective:   Patient seen examined at bedside  No events overnight  Patient endorses that he still feels like he has some slurred speech  He also endorses that he felt a bit lightheaded when he went to the bathroom overnight  Otherwise, the patient denies any progressive weakness and states he can ambulate with no difficulty  Review of Systems   Constitutional: Negative for chills and fever  HENT: Negative for ear pain and sore throat  Eyes: Negative for pain and visual disturbance  Respiratory: Negative for cough and shortness of breath  Cardiovascular: Negative for chest pain and palpitations  Gastrointestinal: Negative for abdominal pain and vomiting  Genitourinary: Negative for dysuria and hematuria     Musculoskeletal: Negative for arthralgias and back pain  Skin: Negative for color change and rash  Neurological: Positive for speech difficulty and light-headedness  Negative for seizures and syncope  All other systems reviewed and are negative  Objective:     Vitals:   Temp (24hrs), Av 4 °F (36 9 °C), Min:98 °F (36 7 °C), Max:98 7 °F (37 1 °C)    Temp:  [98 °F (36 7 °C)-98 7 °F (37 1 °C)] 98 7 °F (37 1 °C)  HR:  [60-67] 64  Resp:  [15-37] 22  BP: (136-194)/() 194/98  SpO2:  [94 %-98 %] 95 %  Body mass index is 31 95 kg/m²  Input and Output Summary (last 24 hours): Intake/Output Summary (Last 24 hours) at 4/3/2021 1532  Last data filed at 4/3/2021 0201  Gross per 24 hour   Intake 600 ml   Output --   Net 600 ml       Physical Exam:     Physical Exam  Vitals signs and nursing note reviewed  Constitutional:       Appearance: He is well-developed  HENT:      Head: Normocephalic and atraumatic  Eyes:      Conjunctiva/sclera: Conjunctivae normal    Neck:      Musculoskeletal: Neck supple  Cardiovascular:      Rate and Rhythm: Normal rate and regular rhythm  Heart sounds: No murmur  Pulmonary:      Effort: Pulmonary effort is normal  No respiratory distress  Breath sounds: Normal breath sounds  Abdominal:      Palpations: Abdomen is soft  Tenderness: There is no abdominal tenderness  Skin:     General: Skin is warm and dry  Neurological:      Mental Status: He is alert and oriented to person, place, and time             Additional Data:     Labs:    Results from last 7 days   Lab Units 21  0603   WBC Thousand/uL 10 09   HEMOGLOBIN g/dL 15 8   HEMATOCRIT % 46 5   PLATELETS Thousands/uL 297   NEUTROS PCT % 52   LYMPHS PCT % 35   MONOS PCT % 9   EOS PCT % 2     Results from last 7 days   Lab Units 21  0551 21  0603   SODIUM mmol/L 139 138   POTASSIUM mmol/L 3 7 3 6   CHLORIDE mmol/L 104 102   CO2 mmol/L 28 29   BUN mg/dL 16 17   CREATININE mg/dL 1 42* 1 38*   ANION GAP mmol/L 7 7 CALCIUM mg/dL 8 7 8 7   ALBUMIN g/dL  --  3 3*   TOTAL BILIRUBIN mg/dL  --  0 76   ALK PHOS U/L  --  65   ALT U/L  --  33   AST U/L  --  19   GLUCOSE RANDOM mg/dL 114 116     Results from last 7 days   Lab Units 04/01/21  1231   INR  0 89         Results from last 7 days   Lab Units 04/01/21  1854   HEMOGLOBIN A1C % 5 8*     Results from last 7 days   Lab Units 04/01/21  1231   LACTIC ACID mmol/L 1 3           * I Have Reviewed All Lab Data Listed Above  * Additional Pertinent Lab Tests Reviewed: [address] Admission Reviewed    Imaging:    reviewed    Recent Cultures (last 7 days):           Last 24 Hours Medication List:   Current Facility-Administered Medications   Medication Dose Route Frequency Provider Last Rate    amLODIPine  10 mg Oral Daily Rigo Wagner MD      aspirin  81 mg Oral Daily Rigo Wagner MD      atorvastatin  40 mg Oral QPM Rigo Wagner MD      heparin (porcine)  5,000 Units Subcutaneous [facility] [address] Rigo Wagner MD      hydrALAZINE  25 mg Oral [facility] Rigo Maldonado MD      Labetalol HCl  10 mg Intravenous Q6H PRN Rigo Wagner MD      sodium chloride  100 mL/hr Intravenous Continuous Rigo Wagner  mL/hr (04/03/21 1145)        Today, Patient Was Seen By: Alfa Bateman MD    ** Please Note: Dictation voice to text software may have been used in the creation of this document   **

## 2021-04-03 NOTE — PHYSICAL THERAPY NOTE
PHYSICAL THERAPY EVALUATION  NAME:  Yza Mac  DATE: 04/03/21    AGE:   45 y o  Mrn:   [id number]  ADMIT DX:  Slurred speech [R47 81]  Hypertension [I10]  Elevated troponin I level [R77 8]  Hypertensive emergency [I16 1]    Past Medical History:   Diagnosis Date    Gout     Hypertension      Length Of Stay: 2  Performed at least 2 patient identifiers during session: Name and Birthday  PHYSICAL THERAPY EVALUATION :      04/03/21 1312   PT Last Visit   PT Visit Date 04/03/21   Note Type   Note type Evaluation   Pain Assessment   Pain Assessment Tool 0-10   Pain Score No Pain   Home Living   Type of Home House  (3-4 HARDY B HRs)   Home Layout One level;Performs ADLs on one level; Able to live on main level with bedroom/bathroom   Bathroom Shower/Tub Tub/shower unit   [employer] Standard   Additional Comments Reports living in a 1Sh with 4-5 HARDY  no AD  Prior Function   Level of [place] Independent with ADLs and functional mobility   Lives With Friend(s)   ADL Assistance Independent   IADLs Independent  (+ driving)   Falls in the last 6 months 0   Vocational   (not working)   Comments Reports being independent PTA without AD  independent with ADLs and IADLs  Restrictions/Precautions   Other Precautions Fall Risk;Telemetry;Multiple lines   General   Additional Pertinent History MRI brain 4/2/2021: A few small foci of diffusion restriction bilateral cerebral hemispheres in 2 separate vascular territories, worrisome for acute embolic infarctions  Cognition   Orientation Level Oriented X4   Following Commands Follows all commands and directions without difficulty   Comments noted dysarthria at times      RUE Assessment   RUE Assessment WFL   LUE Assessment   LUE Assessment WFL   RLE Assessment   RLE Assessment WFL  (4+/5 except hip flexion 4/5)   LLE Assessment   LLE Assessment WFL  (5/5)   Coordination   Movements are Fluid and Coordinated 0   Coordination and Movement Description slightly slower alt supination/pronation L compared to right  increased time to complete finger to nose bilaterally, but decreased > L compared to R, LEs intact to light touch and heel to shin   Light Touch   RLE Light Touch Grossly intact   LLE Light Touch Grossly intact   Bed Mobility   Supine to Sit 6  Modified independent   Additional items Increased time required   Additional Comments HOB flat without bedrail  increased time to complete   Transfers   Sit to Stand 5  Supervision   Additional items Verbal cues   Stand to Sit 5  Supervision   Additional items Verbal cues   Stand pivot 5  Supervision   Additional Comments sit<>stand with supervision  stepping upon standing  wide SAULO  Ambulation/Elevation   Gait pattern Wide SAULO; Short stride  (dec arm swing, inc path deviation)   Gait Assistance   (close supervision)   Additional items Verbal cues   Assistive Device None   Distance 50'x1 without AD with wide SAULO, decreased step length, arm swing and inc path deviation   Balance   Static Sitting Normal   Dynamic Sitting Good   Static Standing Fair +   Dynamic Standing Fair   Ambulatory Fair   Endurance Deficit   Endurance Deficit Description elevated SBP, patient asymptomatic   Activity Tolerance   Activity Tolerance Patient limited by fatigue   Nurse Made Aware Natalie MOSLEY   Assessment   Prognosis Excellent   Problem List Decreased strength;Decreased endurance; Impaired balance;Decreased mobility; Impaired judgement   Barriers to Discharge None   Goals   Patient Goals "Go home"   STG Expiration Date 04/13/21   PT Treatment Day 1   Plan   Treatment/Interventions Functional transfer training;LE strengthening/ROM; Elevations; Therapeutic exercise; Endurance training;Patient/family training;Equipment eval/education; Bed mobility;Gait training; Compensatory technique education;Spoke to nursing   PT Frequency 2-3x/wk   Recommendation   PT Discharge Recommendation Return to previous environment with social support; Other (Comment)  (OPPT) Equipment Recommended   (none anticipated)   PT - OK to Discharge   (when medically cleared)   Additional Comments pt sitting in recliner chair at end of session with all needs liza reach  verbalized understanding to ring for assistance with mobility   AM-PAC Basic Mobility Inpatient   Turning in Bed Without Bedrails 4   Lying on Back to Sitting on Edge of Flat Bed 4   Moving Bed to Chair 3   Standing Up From Chair 3   Walk in Room 3   Climb 3-5 Stairs 3   Basic Mobility Inpatient Raw Score 20   Basic Mobility Standardized Score 43 99     (Please find full objective findings from PT assessment regarding body systems outlined above)  Assessment: Pt is a 45 y o  male seen for PT evaluation s/p admission to [address] [place] [address] on 4/1/2021 with Hypertensive urgency  Order placed for PT services  Upon evaluation: Pt is presenting with impaired functional mobility due to decreased strength, impaired balance, impaired coordination, gait deviations, decreased safety awareness and fall risk requiring supervision assistance for transfers and close supervision assistance for ambulation with out AD  Pt's clinical presentation is currently unpredictable given the functional mobility deficits above, especially decreased endurance, gait deviations, decreased activity tolerance and decreased safety awareness, coupled with fall risks as indicated by AM-PAC 6-Clicks: 77/82 as well as impaired balance, polypharmacy and decreased safety awareness and combined with medical complications of hypertension , abnormal renal lab values, abnormal WBCs and abnormal potassium values, and abnormal troponin  Pt's PMHx and comorbidities that may affect physical performance and progress include: HTN and gout  Personal factors affecting pt at time of IE include: step(s) to enter environment, inability to navigate community distances, tobacco use and drug use   Pt will benefit from continued skilled PT services to address deficits as defined above and to maximize level of functional mobility to facilitate return toward PLOF and improved QOL  From PT/mobility standpoint, recommendation at time of d/c would be OP PT pending progress in order to reduce fall risk and maximize pt's functional independence and consistency with mobility in order to facilitate return to PLOF  Recommend ther ex next 1-2 sessions and balance training  The patient's AM-PAC Basic Mobility Inpatient Short Form Raw Score is 20, Standardized Score is 43 99  A standardized score greater than 42 9 suggests the patient may benefit from discharge to home  Please also refer to the recommendation of the Physical Therapist for safe discharge planning  Goals: Pt will: Pt will perform transfers with independent to increase Indep in home environment and decrease risk for falls and prepare for ambulation  Pt will ambulate with out AD for >/= 300' with  independent  to increase Indep in home environment, decrease risk for falls, improve activity tolerance and improve gait quality and to access home environment  Pt will complete >/= 12 steps with with unilateral handrail with modified I to increase Indep in home environment, return to home with HARDY, decrease risk for falls and improve activity tolerance  Pt will increase B LE strength >/= 1/2 MMT grade to facilitate functional mobility  Pt will participate in objective balance assessment to determine baseline fall risk  Sara Pollack, PT,DPT     PHYSICAL THERAPY TREATMENT NOTE  Time In:1337  Time Out: 4580  Total Time: 8'      S:  "I will a little wobbly"  O:  Sit<>stand with supervision  Ambulated 130'x1 without AD with supervision with slow adarsh, wide SAULO, increased path deviation with and without head turns  Guarded with decreased arm swing  A:  Pt requires increased time to complete mobility  He was able to ambulate increased distance, but increased path deviation and slowed adarsh   He will continue to benefit from PT services to maximize LOF with improved balance and gait  P:  Recommend LE functional strengthening, NMRE, gait training, stairs       Mery Pizano, PT, DPT

## 2021-04-03 NOTE — CONSULTS
Consultation - Nephrology   Katherine Guevara 45 y o  male MRN: [id number]  Unit/Bed#: -01 Encounter: [id number]  Plan:  -continue to monitor and trend renal function   -continues on normal saline   -will check urinalysis  -recommend holding Ace/Arb/diuretic morning of contrast administration  -recommend holding parameters antihypertensive for systolic blood pressure less than 130   -recommend pre and post contrast administration hydration   -avoid rapid lowering of blood pressure  ASSESSMENT/PLAN:  Renal optimization to reduce incidence of acute kidney injury:   -baseline creatinine: unclear, noted to be 1 7 in October 2020 and 1 1 in July 2020   -presents with creatinine of 1 53     -will check urinalysis  -currently on normal saline at 100 cc/hour   -renal ultrasound shows normal echogenicity and contour  -patient with potential need for IV contrast administration  -recommend hydrating pre and post contrast administration   -avoid ACE-inhibitor/Arb/diuretic morning of contrast administration  -holding parameters adjusted on antihypertensives  -recommend avoiding nephrotoxins, hypotension, minimize IV contrast   -I/O  Hypertension:  Presented with systolic blood pressure in the 200s over 100s  -not on outpatient antihypertensives  -currently on amlodipine, hydralazine, and p r n  Labetalol   -avoid rapid lowering of blood pressure   -recommend holding parameters antihypertensive for systolic blood pressure less than 130  Acute CVA:   -MRI showed acute embolic infarcts   -neurology team following   -undergoing workup including CHRISTIANNE, possible loop, bilateral carotid Dopplers, and CT with/without contrast   -please see above for contrast recommendations  Other:  Tobacco abuse, hyperlipidemia      HISTORY OF PRESENT ILLNESS:  Requesting Physician: Vlad Milton *  Reason for Consult:  Renal optimization to reduce incidence of acute kidney injury    Katherine Guevara is a 45 y o  year old male who was admitted to [address] [place] [address] after presenting with start speech and unsteady gait over the past couple of weeks  The patient denies any headaches  He admits to feeling occasional lightheadedness  He denies any vision changes  A renal consultation is requested today for assistance in the management of renal optimization  Patient denies any fevers or chills  He denies chest pain or shortness of breath  He denies nausea, vomiting, diarrhea, or changes to his appetite  He reports having some lower extremity edema  He denies the use of NSAIDs  He denies any previous renal history  PAST MEDICAL HISTORY:  Past Medical History:   Diagnosis Date    Gout     Hypertension        PAST SURGICAL HISTORY:  History reviewed  No pertinent surgical history  ALLERGIES:  No Known Allergies    SOCIAL HISTORY:  Social History     Substance and Sexual Activity   Alcohol Use Yes     Social History     Substance and Sexual Activity   Drug Use Yes    Types: Marijuana     Social History     Tobacco Use   Smoking Status Current Some Day Smoker   Smokeless Tobacco Never Used       FAMILY HISTORY:  History reviewed  No pertinent family history      MEDICATIONS:    Current Facility-Administered Medications:     amLODIPine (NORVASC) tablet 10 mg, 10 mg, Oral, Daily, Colon Given Yomaira Maldonado MD, 10 mg at 04/03/21 0856    aspirin (ECOTRIN LOW STRENGTH) EC tablet 81 mg, 81 mg, Oral, Daily, Colon Given Yomaira Maldonado MD, 81 mg at 04/03/21 0856    atorvastatin (LIPITOR) tablet 40 mg, 40 mg, Oral, QPM, Cary Maldonado MD, 40 mg at 04/02/21 1823    heparin (porcine) subcutaneous injection 5,000 Units, 5,000 Units, Subcutaneous, Q8H [address], 5,000 Units at 04/01/21 1825 **AND** [COMPLETED] Platelet count, , , Once, Cary Maldonado MD    Labetalol HCl (NORMODYNE) injection 10 mg, 10 mg, Intravenous, Q6H PRN, Colon Given Dale Duffy MD    sodium chloride 0 9 % infusion, 100 mL/hr, Intravenous, Continuous, Jj Maldonado MD    REVIEW OF SYSTEMS:  A complete review of systems was performed and found to be negative unless otherwise noted in the history of present illness  General: No fevers, chills  Cardiovascular:  - chest pain, + leg edema  Respiratory: No cough, sputum production,  - shortness of breath  Gastrointestinal:  - nausea/vomiting,  - diarrhea,  - abdominal pain  Genitourinary: No hematuria  No foamy urine    No dysuria    PHYSICAL EXAM:  Current Weight: Weight - Scale: 101 kg (222 lb 10 6 oz)  First Weight: Weight - Scale: 100 kg (221 lb 5 5 oz)  Vitals:    04/03/21 0120 04/03/21 0400 04/03/21 0600 04/03/21 0800   BP: 156/82 (!) 173/97  158/85   BP Location:  Left arm  Left arm   Pulse: 65 60  63   Resp: 17 18  15   Temp:  98 °F (36 7 °C)     TempSrc:  Oral     SpO2: 95% 95%  95%   Weight:   101 kg (222 lb 10 6 oz)    Height:           Intake/Output Summary (Last 24 hours) at 4/3/2021 1012  Last data filed at 4/3/2021 0201  Gross per 24 hour   Intake 600 ml   Output --   Net 600 ml     General: NAD  Skin: warm, dry, intact, no rash  HEENT: Moist mucous membranes, sclera anicteric, normocephalic, atraumatic  Neck: No apparent JVD appreciated  Chest:lung sounds clear B/L, on RA   CVS:Regular rate and rhythm, no murmer   Abdomen: Soft, round, non-tender, +BS  Extremities: trace B/L LE edema present  Neuro: alert and oriented  Psych: appropriate mood and affect     Invasive Devices:      Lab Results:   Results from last 7 days   Lab Units 04/03/21  0551 04/02/21  0603 04/01/21  1854 04/01/21  1231   WBC Thousand/uL  --  10 09  --  12 40*   HEMOGLOBIN g/dL  --  15 8  --  16 9   HEMATOCRIT %  --  46 5  --  49 2   PLATELETS Thousands/uL  --  297 296 324   SODIUM mmol/L 139 138  --  140   POTASSIUM mmol/L 3 7 3 6  --  3 6   CHLORIDE mmol/L 104 102  --  99*   CO2 mmol/L 28 29  --  33*   BUN mg/dL 16 17  --  17   CREATININE mg/dL 1 42* 1 38*  --  1 53*   CALCIUM mg/dL 8 7 8 7  --  9 3   MAGNESIUM mg/dL  --  2 0  --   --    PHOSPHORUS mg/dL  --  3 5  --   --    ALK PHOS U/L  --  65  --  84   ALT U/L  --  33  --  32   AST U/L  --  19  --  18

## 2021-04-03 NOTE — CASE MANAGEMENT
Chart reviewed aware of medical management  CM consulted per Stroke Protocol  Case was discussed in with provider in [facility]  Clinical information supporting hospitalization:   - Pt came in with hypertensive urgency  - MRI +for multifocal embolic appearing strokes per Neurology  - Plan is hypercoagulable panel with CT of CAP    Scheduled Meds:  Barriers to discharge:  - medical management  Discharge plan:  Home      CM provided medications discount cards  Information on obtaining [facility] and [address] [facility] in [place]

## 2021-04-04 LAB
ANION GAP SERPL CALCULATED.3IONS-SCNC: 7 MMOL/L (ref 4–13)
ATRIAL RATE: 89 BPM
BASOPHILS # BLD AUTO: 0.09 THOUSANDS/ΜL (ref 0–0.1)
BASOPHILS NFR BLD AUTO: 1 % (ref 0–1)
BUN SERPL-MCNC: 14 MG/DL (ref 5–25)
CALCIUM SERPL-MCNC: 8.6 MG/DL (ref 8.3–10.1)
CHLORIDE SERPL-SCNC: 103 MMOL/L (ref 100–108)
CO2 SERPL-SCNC: 27 MMOL/L (ref 21–32)
CREAT SERPL-MCNC: 1.35 MG/DL (ref 0.6–1.3)
CREAT UR-MCNC: 83.3 MG/DL
EOSINOPHIL # BLD AUTO: 0.25 THOUSAND/ΜL (ref 0–0.61)
EOSINOPHIL NFR BLD AUTO: 2 % (ref 0–6)
ERYTHROCYTE [DISTWIDTH] IN BLOOD BY AUTOMATED COUNT: 13.2 % (ref 11.6–15.1)
GFR SERPL CREATININE-BSD FRML MDRD: 60 ML/MIN/1.73SQ M
GLUCOSE SERPL-MCNC: 113 MG/DL (ref 65–140)
HCT VFR BLD AUTO: 48.6 % (ref 36.5–49.3)
HGB BLD-MCNC: 16.5 G/DL (ref 12–17)
IMM GRANULOCYTES # BLD AUTO: 0.07 THOUSAND/UL (ref 0–0.2)
IMM GRANULOCYTES NFR BLD AUTO: 1 % (ref 0–2)
LYMPHOCYTES # BLD AUTO: 4.23 THOUSANDS/ΜL (ref 0.6–4.47)
LYMPHOCYTES NFR BLD AUTO: 39 % (ref 14–44)
MAGNESIUM SERPL-MCNC: 2 MG/DL (ref 1.6–2.6)
MCH RBC QN AUTO: 31 PG (ref 26.8–34.3)
MCHC RBC AUTO-ENTMCNC: 34 G/DL (ref 31.4–37.4)
MCV RBC AUTO: 91 FL (ref 82–98)
MONOCYTES # BLD AUTO: 0.78 THOUSAND/ΜL (ref 0.17–1.22)
MONOCYTES NFR BLD AUTO: 7 % (ref 4–12)
NEUTROPHILS # BLD AUTO: 5.41 THOUSANDS/ΜL (ref 1.85–7.62)
NEUTS SEG NFR BLD AUTO: 50 % (ref 43–75)
NRBC BLD AUTO-RTO: 0 /100 WBCS
P AXIS: 50 DEGREES
PLATELET # BLD AUTO: 299 THOUSANDS/UL (ref 149–390)
PMV BLD AUTO: 10.1 FL (ref 8.9–12.7)
POTASSIUM SERPL-SCNC: 4 MMOL/L (ref 3.5–5.3)
PR INTERVAL: 132 MS
PROT UR-MCNC: <6 MG/DL
QRS AXIS: 53 DEGREES
QRSD INTERVAL: 86 MS
QT INTERVAL: 348 MS
QTC INTERVAL: 423 MS
RBC # BLD AUTO: 5.33 MILLION/UL (ref 3.88–5.62)
SODIUM 24H UR-SCNC: 47 MOL/L
SODIUM SERPL-SCNC: 137 MMOL/L (ref 136–145)
T WAVE AXIS: 207 DEGREES
VENTRICULAR RATE: 89 BPM
WBC # BLD AUTO: 10.83 THOUSAND/UL (ref 4.31–10.16)

## 2021-04-04 PROCEDURE — 99232 SBSQ HOSP IP/OBS MODERATE 35: CPT | Performed by: STUDENT IN AN ORGANIZED HEALTH CARE EDUCATION/TRAINING PROGRAM

## 2021-04-04 PROCEDURE — 85025 COMPLETE CBC W/AUTO DIFF WBC: CPT | Performed by: STUDENT IN AN ORGANIZED HEALTH CARE EDUCATION/TRAINING PROGRAM

## 2021-04-04 PROCEDURE — 93010 ELECTROCARDIOGRAM REPORT: CPT | Performed by: INTERNAL MEDICINE

## 2021-04-04 PROCEDURE — 80048 BASIC METABOLIC PNL TOTAL CA: CPT | Performed by: STUDENT IN AN ORGANIZED HEALTH CARE EDUCATION/TRAINING PROGRAM

## 2021-04-04 PROCEDURE — 83735 ASSAY OF MAGNESIUM: CPT | Performed by: STUDENT IN AN ORGANIZED HEALTH CARE EDUCATION/TRAINING PROGRAM

## 2021-04-04 RX ORDER — HYDRALAZINE HYDROCHLORIDE 25 MG/1
50 TABLET, FILM COATED ORAL EVERY 8 HOURS SCHEDULED
Status: DISCONTINUED | OUTPATIENT
Start: 2021-04-04 | End: 2021-04-06

## 2021-04-04 RX ADMIN — HYDRALAZINE HYDROCHLORIDE 25 MG: 25 TABLET ORAL at 06:37

## 2021-04-04 RX ADMIN — ATORVASTATIN CALCIUM 40 MG: 40 TABLET, FILM COATED ORAL at 16:55

## 2021-04-04 RX ADMIN — SODIUM CHLORIDE 100 ML/HR: 0.9 INJECTION, SOLUTION INTRAVENOUS at 09:06

## 2021-04-04 RX ADMIN — HEPARIN SODIUM 5000 UNITS: 5000 INJECTION INTRAVENOUS; SUBCUTANEOUS at 06:37

## 2021-04-04 RX ADMIN — LABETALOL 20 MG/4 ML (5 MG/ML) INTRAVENOUS SYRINGE 10 MG: at 16:56

## 2021-04-04 RX ADMIN — HEPARIN SODIUM 5000 UNITS: 5000 INJECTION INTRAVENOUS; SUBCUTANEOUS at 23:01

## 2021-04-04 RX ADMIN — ASPIRIN 81 MG: 81 TABLET, COATED ORAL at 09:08

## 2021-04-04 RX ADMIN — HEPARIN SODIUM 5000 UNITS: 5000 INJECTION INTRAVENOUS; SUBCUTANEOUS at 14:45

## 2021-04-04 RX ADMIN — HYDRALAZINE HYDROCHLORIDE 50 MG: 25 TABLET ORAL at 14:45

## 2021-04-04 RX ADMIN — AMLODIPINE BESYLATE 10 MG: 10 TABLET ORAL at 09:08

## 2021-04-04 RX ADMIN — HYDRALAZINE HYDROCHLORIDE 50 MG: 25 TABLET ORAL at 23:00

## 2021-04-04 RX ADMIN — LABETALOL 20 MG/4 ML (5 MG/ML) INTRAVENOUS SYRINGE 10 MG: at 04:29

## 2021-04-04 NOTE — QUICK NOTE
Following for possible acute kidney injury, renal optimization  Vital signs reviewed  Blood pressure above goal   Creatinine continues to improve  Will increase hydralazine to 50 mg every 8 hours  Recommend hydration pre and post contrast administration  Okay to discontinue IVF 4 hours post contrast administration

## 2021-04-04 NOTE — PROGRESS NOTES
[address]  Progress Note Weston Dalal 1970, 45 y o  male MRN: [id number]  Unit/Bed#: -01 Encounter: [id number]  Primary Care Provider: No primary care provider on file  Date and time admitted to hospital: 4/1/2021 12:23 PM    Acute CVA (cerebrovascular accident) [facility])  Assessment & Plan  Patient presenting to the ED with 3-week history of slurred speech and gait disturbance  On presentation, patient with hypertensive urgency as well as SRIRAM  CTH negative for acute inchemia however showing multiple lacunar infarcts  MRI obtained - results significant for acute embolic infarcts  Stroke pathway initiated  Patient was already started on statin for elevated ascvd risk however increased dose to 40  Start patient on aspirin  Neuro checks  Neuro consult placed, recs appreciated  -"Given age needs CHRISTIANNE  - possible loop  - hypercoagulable panel   - CT C/A/P with/without contrast to assess for any maligancy - no apparent malignancy noted on CT chest abdomen pelvis, however patient did have some pulmonary nodules with recommendation to for follow-up CT scan in 3 months  - bilateral lower extremity Dopplers to rule out DVT     PT evaluated patient and patient cleared to be discharged to home once medically cleared     Normalize BP       * Hypertensive urgency  Assessment & Plan  · Patient presenting with BP in 200s/100s  · No known history of HTN however has not seen a physician in >10 years  · Never been on any anti-hypertensive medications  · Patient states that he has been having weakness and slurred speech associated over the last 3 weeks  · No focal deficits on exam  · Patient also admits to methamphetamine use as well as marijuana - denies recent use however may contribute to his BP  · utox ordered  · Mynor Allison as follows     "No evidence of acute infarct or intracranial hemorrhage    Patchy areas of microvascular ischemic change with small lacunar infarcts    Asymmetric right transverse and sigmoid sinus commonly can be a normal variant    If there is significant posterior fossa symptomatology clinically, further imaging assessment could be performed "    · BP still elevated despite hydralazine  · Patient's BP up and down on amlodipine 10 mg daily  · Added hydralazine 25 mg TID as patient cannot be started on ACEi/ARB in light of SRIRAM/CKD and also avoiding diuretics and hydrating prior to CT scan with contrast for embolic stroke work-up  · TTE significant for left ventricular hypertrophy likely secondary to long-standing uncontrolled hypertension    Elevated troponin level not due to acute coronary syndrome  Assessment & Plan  · Troponin elevated to 0 72 on admission, denies any chest pain however does have shortness of breath  · Troponin elevation in setting of significant hypertension, possibly secondary to demand  · Will trend troponin - increased to 0 8 however now 0 4   · Monitor patient on telemetry - no events  · TTE showing normal EF and concentric LVH likely secondary to longstanding HTN  · Given CVA, patient started on Atorvastatin 40 mg qhs  · Serial EKG - non-ischemic    Acute kidney injury [facility])  Assessment & Plan  · Patient presented with creatinine of 1 53 with unknown baseline (in October 2020, patient was here with creatinine of 1 7)  · Patient has not seen any provider in the last 10 years and has not been on any maintenance medications  · Patient unaware of any other comorbidities  · SRIRAM protocol  · I&Os  · Kidney and bladder sono - normal  · Monitor creatinine - IMPROVING  · Avoid nephrotoxins  · Avoid hypotensive episodes  · Nephrology consulted for optimization of renal function prior to imaging with contrast for embolic stroke work-up, recommendafter imaging; patient not on ACEi/ARB, patient not on any diureticsations appreciated - patient currently on fluids at this time and will receive a bolus prior to CT scan then will hydrate patient again after CT scan and maintain him on IVF     Leukocytosis  Assessment & Plan  · Unclear etiology  · Will continue to monitor  · No infectious signs at this time    RESOLVED    Hyperlipidemia  Assessment & Plan  · Will start patient on Atorvastatin 40 mg qhs as patient's calculated ASCVD Risk is 18 4%   · Lipid panel reviewed    Tobacco use  Assessment & Plan  · Admits to heavy smoking - 36 pack year smoker   · Offered nicotine patches however patient refused  · Counseled on smoking cessation    Drug use  Assessment & Plan  · Patient admits to using meth and marijuana  · Denies recent use  · utox ordered        VTE Pharmacologic Prophylaxis:   Pharmacologic: Heparin  Mechanical VTE Prophylaxis in Place: Yes    Patient Centered Rounds: I have performed bedside rounds with nursing staff today  Discussions with Specialists or Other Care Team Provider:  Nephrology, Neurology    Education and Discussions with Family / Patient:  Patient and sister    Time Spent for Care: 30 minutes  More than 50% of total time spent on counseling and coordination of care as described above  Current Length of Stay: 3 day(s)    Current Patient Status: Inpatient   Certification Statement: The patient will continue to require additional inpatient hospital stay due to Stroke workup    Discharge Plan:  Home    Code Status: Level 1 - Full Code      Subjective:   Patient seen and examined at bedside  No events overnight  Patient states that his slurring of speech is slowly improving  Patient denies any headaches, blurring of vision, weakness  Patient endorses ambulating without difficulties  Patient tolerating p o  At this time  Patient denies any chest pain or shortness of breath  Review of Systems   Constitutional: Negative for chills and fever  HENT: Negative for ear pain and sore throat  Eyes: Negative for pain and visual disturbance  Respiratory: Negative for cough and shortness of breath      Cardiovascular: Negative for chest pain and palpitations  Gastrointestinal: Negative for abdominal pain and vomiting  Genitourinary: Negative for dysuria and hematuria  Musculoskeletal: Negative for arthralgias and back pain  Skin: Negative for color change and rash  Neurological: Negative for seizures and syncope  All other systems reviewed and are negative  Objective:     Vitals:   Temp (24hrs), Av 2 °F (36 8 °C), Min:98 1 °F (36 7 °C), Max:98 3 °F (36 8 °C)    Temp:  [98 1 °F (36 7 °C)-98 3 °F (36 8 °C)] 98 1 °F (36 7 °C)  HR:  [58-79] 61  Resp:  [18-28] 20  BP: (132-194)/() 164/87  SpO2:  [95 %-98 %] 97 %  Body mass index is 30 53 kg/m²  Input and Output Summary (last 24 hours): Intake/Output Summary (Last 24 hours) at 2021 1249  Last data filed at 2021 1157  Gross per 24 hour   Intake 3258 33 ml   Output 1325 ml   Net 1933 33 ml       Physical Exam:     Physical Exam  Vitals signs and nursing note reviewed  Constitutional:       Appearance: He is well-developed  HENT:      Head: Normocephalic and atraumatic  Eyes:      Conjunctiva/sclera: Conjunctivae normal    Neck:      Musculoskeletal: Neck supple  Cardiovascular:      Rate and Rhythm: Normal rate and regular rhythm  Heart sounds: No murmur  Pulmonary:      Effort: Pulmonary effort is normal  No respiratory distress  Breath sounds: Normal breath sounds  Abdominal:      Palpations: Abdomen is soft  Tenderness: There is no abdominal tenderness  Skin:     General: Skin is warm and dry  Neurological:      Mental Status: He is alert and oriented to person, place, and time             Additional Data:     Labs:    Results from last 7 days   Lab Units 21  0426   WBC Thousand/uL 10 83*   HEMOGLOBIN g/dL 16 5   HEMATOCRIT % 48 6   PLATELETS Thousands/uL 299   NEUTROS PCT % 50   LYMPHS PCT % 39   MONOS PCT % 7   EOS PCT % 2     Results from last 7 days   Lab Units 21  0436  21  0603   SODIUM mmol/L 137   < > 138 POTASSIUM mmol/L 4 0   < > 3 6   CHLORIDE mmol/L 103   < > 102   CO2 mmol/L 27   < > 29   BUN mg/dL 14   < > 17   CREATININE mg/dL 1 35*   < > 1 38*   ANION GAP mmol/L 7   < > 7   CALCIUM mg/dL 8 6   < > 8 7   ALBUMIN g/dL  --   --  3 3*   TOTAL BILIRUBIN mg/dL  --   --  0 76   ALK PHOS U/L  --   --  65   ALT U/L  --   --  33   AST U/L  --   --  19   GLUCOSE RANDOM mg/dL 113   < > 116    < > = values in this interval not displayed  Results from last 7 days   Lab Units 04/01/21  1231   INR  0 89         Results from last 7 days   Lab Units 04/01/21  1854   HEMOGLOBIN A1C % 5 8*     Results from last 7 days   Lab Units 04/01/21  1231   LACTIC ACID mmol/L 1 3           * I Have Reviewed All Lab Data Listed Above  * Additional Pertinent Lab Tests Reviewed: [place] [facility] Admission Reviewed    Imaging:    Reviewed    Recent Cultures (last 7 days):           Last 24 Hours Medication List:   Current Facility-Administered Medications   Medication Dose Route Frequency Provider Last Rate    amLODIPine  10 mg Oral Daily Darryl Kittitas Christa Avina MD      aspirin  81 mg Oral Daily Darryl Kittitas Christa Avina MD      atorvastatin  40 mg Oral QPM Darryl Elliot Avina MD      heparin (porcine)  5,000 Units Subcutaneous [facility] Darryl Elliot Maldonado MD      hydrALAZINE  50 mg Oral [facility] PEEWEE Del Real      Labetalol HCl  10 mg Intravenous Q6H PRN Darryl Elliot Avina MD          Today, Patient Was Seen By: Jeff Strickland MD    ** Please Note: Dictation voice to text software may have been used in the creation of this document   **

## 2021-04-04 NOTE — ASSESSMENT & PLAN NOTE
Patient presenting to the ED with 3-week history of slurred speech and gait disturbance  On presentation, patient with hypertensive urgency as well as SRIRAM  CTH negative for acute inchemia however showing multiple lacunar infarcts  MRI obtained - results significant for acute embolic infarcts  Stroke pathway initiated  Patient was already started on statin for elevated ascvd risk however increased dose to 40  Start patient on aspirin  Neuro checks  Neuro consult placed, recs appreciated  -"Given age needs CHRISTIANNE  - possible loop  - hypercoagulable panel   - CT C/A/P with/without contrast to assess for any maligancy - no apparent malignancy noted on CT chest abdomen pelvis, however patient did have some pulmonary nodules with recommendation to for follow-up CT scan in 3 months  - bilateral lower extremity Dopplers to rule out DVT     PT evaluated patient and patient cleared to be discharged to home once medically cleared     Normalize BP

## 2021-04-04 NOTE — PLAN OF CARE
Problem: CARDIOVASCULAR - ADULT  Goal: Maintains optimal cardiac output and hemodynamic stability  Description: INTERVENTIONS:  - Monitor I/O, vital signs and rhythm  - Monitor for S/S and trends of decreased cardiac output  - Administer and titrate ordered vasoactive medications to optimize hemodynamic stability  - Assess quality of pulses, skin color and temperature  - Assess for signs of decreased coronary artery perfusion  - Instruct patient to report change in severity of symptoms  Outcome: Progressing  Goal: Absence of cardiac dysrhythmias or at baseline rhythm  Description: INTERVENTIONS:  - Continuous cardiac monitoring, vital signs, obtain 12 lead EKG if ordered  - Administer antiarrhythmic and heart rate control medications as ordered  - Monitor electrolytes and administer replacement therapy as ordered  Outcome: Progressing     Problem: NEUROSENSORY - ADULT  Goal: Achieves stable or improved neurological status  Description: INTERVENTIONS  - Monitor and report changes in neurological status  - Monitor vital signs such as temperature, blood pressure, glucose, and any other labs ordered   - Initiate measures to prevent increased intracranial pressure  - Monitor for seizure activity and implement precautions if appropriate      Outcome: Progressing     Problem: SAFETY ADULT  Goal: Patient will remain free of falls  Description: INTERVENTIONS:  - Assess patient frequently for physical needs  -  Identify cognitive and physical deficits and behaviors that affect risk of falls    -  Wanette fall precautions as indicated by assessment   - Educate patient/family on patient safety including physical limitations  - Instruct patient to call for assistance with activity based on assessment  - Modify environment to reduce risk of injury, call bell within reach  - Consider OT/PT consult to assist with strengthening/mobility  Outcome: Progressing

## 2021-04-04 NOTE — ASSESSMENT & PLAN NOTE
· Patient presented with creatinine of 1 53 with unknown baseline (in October 2020, patient was here with creatinine of 1 7)  · Patient has not seen any provider in the last 10 years and has not been on any maintenance medications  · Patient unaware of any other comorbidities  · SRIRAM protocol  · I&Os  · Kidney and bladder sono - normal  · Monitor creatinine - IMPROVING  · Avoid nephrotoxins  · Avoid hypotensive episodes  · Nephrology consulted for optimization of renal function prior to imaging with contrast for embolic stroke work-up, recommendafter imaging; patient not on ACEi/ARB, patient not on any diureticsations appreciated - patient currently on fluids at this time and will receive a bolus prior to CT scan then will hydrate patient again after CT scan and maintain him on IVF

## 2021-04-04 NOTE — ASSESSMENT & PLAN NOTE
· Patient presenting with BP in 200s/100s  · No known history of HTN however has not seen a physician in >10 years  · Never been on any anti-hypertensive medications  · Patient states that he has been having weakness and slurred speech associated over the last 3 weeks  · No focal deficits on exam  · Patient also admits to methamphetamine use as well as marijuana - denies recent use however may contribute to his BP  · utox ordered  · [facility] as follows     "No evidence of acute infarct or intracranial hemorrhage    Patchy areas of microvascular ischemic change with small lacunar infarcts    Asymmetric right transverse and sigmoid sinus commonly can be a normal variant    If there is significant posterior fossa symptomatology clinically, further imaging assessment could be performed "    · BP still elevated despite hydralazine  · Patient's BP up and down on amlodipine 10 mg daily  · Added hydralazine 25 mg TID as patient cannot be started on ACEi/ARB in light of SRIRAM/CKD and also avoiding diuretics and hydrating prior to CT scan with contrast for embolic stroke work-up  · TTE significant for left ventricular hypertrophy likely secondary to long-standing uncontrolled hypertension

## 2021-04-05 ENCOUNTER — ANESTHESIA (INPATIENT)
Dept: NON INVASIVE DIAGNOSTICS | Facility: HOSPITAL | Age: 51
DRG: 045 | End: 2021-04-05
Payer: COMMERCIAL

## 2021-04-05 ENCOUNTER — APPOINTMENT (INPATIENT)
Dept: NON INVASIVE DIAGNOSTICS | Facility: HOSPITAL | Age: 51
DRG: 045 | End: 2021-04-05
Attending: STUDENT IN AN ORGANIZED HEALTH CARE EDUCATION/TRAINING PROGRAM
Payer: COMMERCIAL

## 2021-04-05 PROBLEM — F32.1 CURRENT MODERATE EPISODE OF MAJOR DEPRESSIVE DISORDER WITHOUT PRIOR EPISODE (HCC): Status: ACTIVE | Noted: 2021-04-05

## 2021-04-05 PROBLEM — N18.9 ACUTE KIDNEY INJURY SUPERIMPOSED ON CHRONIC KIDNEY DISEASE (HCC): Status: ACTIVE | Noted: 2020-10-21

## 2021-04-05 LAB
ANION GAP SERPL CALCULATED.3IONS-SCNC: 8 MMOL/L (ref 4–13)
ATRIAL RATE: 63 BPM
BUN SERPL-MCNC: 15 MG/DL (ref 5–25)
CALCIUM SERPL-MCNC: 8.9 MG/DL (ref 8.3–10.1)
CHLORIDE SERPL-SCNC: 104 MMOL/L (ref 100–108)
CO2 SERPL-SCNC: 26 MMOL/L (ref 21–32)
CREAT SERPL-MCNC: 1.27 MG/DL (ref 0.6–1.3)
GFR SERPL CREATININE-BSD FRML MDRD: 65 ML/MIN/1.73SQ M
GLUCOSE SERPL-MCNC: 102 MG/DL (ref 65–140)
GLUCOSE SERPL-MCNC: 170 MG/DL (ref 65–140)
GLUCOSE SERPL-MCNC: 82 MG/DL (ref 65–140)
GLUCOSE SERPL-MCNC: 94 MG/DL (ref 65–140)
GLUCOSE SERPL-MCNC: 95 MG/DL (ref 65–140)
P AXIS: 52 DEGREES
POTASSIUM SERPL-SCNC: 3.9 MMOL/L (ref 3.5–5.3)
PR INTERVAL: 138 MS
QRS AXIS: 53 DEGREES
QRSD INTERVAL: 86 MS
QT INTERVAL: 432 MS
QTC INTERVAL: 442 MS
SODIUM SERPL-SCNC: 138 MMOL/L (ref 136–145)
T WAVE AXIS: 193 DEGREES
VENTRICULAR RATE: 63 BPM

## 2021-04-05 PROCEDURE — 85670 THROMBIN TIME PLASMA: CPT | Performed by: PSYCHIATRY & NEUROLOGY

## 2021-04-05 PROCEDURE — 85705 THROMBOPLASTIN INHIBITION: CPT | Performed by: PSYCHIATRY & NEUROLOGY

## 2021-04-05 PROCEDURE — 97116 GAIT TRAINING THERAPY: CPT

## 2021-04-05 PROCEDURE — 93970 EXTREMITY STUDY: CPT | Performed by: SURGERY

## 2021-04-05 PROCEDURE — 86147 CARDIOLIPIN ANTIBODY EA IG: CPT | Performed by: PSYCHIATRY & NEUROLOGY

## 2021-04-05 PROCEDURE — 99232 SBSQ HOSP IP/OBS MODERATE 35: CPT | Performed by: PHYSICIAN ASSISTANT

## 2021-04-05 PROCEDURE — 80048 BASIC METABOLIC PNL TOTAL CA: CPT | Performed by: STUDENT IN AN ORGANIZED HEALTH CARE EDUCATION/TRAINING PROGRAM

## 2021-04-05 PROCEDURE — 93970 EXTREMITY STUDY: CPT

## 2021-04-05 PROCEDURE — 97112 NEUROMUSCULAR REEDUCATION: CPT

## 2021-04-05 PROCEDURE — 85300 ANTITHROMBIN III ACTIVITY: CPT | Performed by: PSYCHIATRY & NEUROLOGY

## 2021-04-05 PROCEDURE — 93880 EXTRACRANIAL BILAT STUDY: CPT

## 2021-04-05 PROCEDURE — 93880 EXTRACRANIAL BILAT STUDY: CPT | Performed by: SURGERY

## 2021-04-05 PROCEDURE — 85305 CLOT INHIBIT PROT S TOTAL: CPT | Performed by: PSYCHIATRY & NEUROLOGY

## 2021-04-05 PROCEDURE — 85732 THROMBOPLASTIN TIME PARTIAL: CPT | Performed by: PSYCHIATRY & NEUROLOGY

## 2021-04-05 PROCEDURE — 90471 IMMUNIZATION ADMIN: CPT | Performed by: PHYSICIAN ASSISTANT

## 2021-04-05 PROCEDURE — G0427 INPT/ED TELECONSULT70: HCPCS | Performed by: PSYCHIATRY & NEUROLOGY

## 2021-04-05 PROCEDURE — 81240 F2 GENE: CPT | Performed by: PSYCHIATRY & NEUROLOGY

## 2021-04-05 PROCEDURE — 85306 CLOT INHIBIT PROT S FREE: CPT | Performed by: PSYCHIATRY & NEUROLOGY

## 2021-04-05 PROCEDURE — 85303 CLOT INHIBIT PROT C ACTIVITY: CPT | Performed by: PSYCHIATRY & NEUROLOGY

## 2021-04-05 PROCEDURE — 90670 PCV13 VACCINE IM: CPT | Performed by: PHYSICIAN ASSISTANT

## 2021-04-05 PROCEDURE — 85613 RUSSELL VIPER VENOM DILUTED: CPT | Performed by: PSYCHIATRY & NEUROLOGY

## 2021-04-05 PROCEDURE — 86146 BETA-2 GLYCOPROTEIN ANTIBODY: CPT | Performed by: PSYCHIATRY & NEUROLOGY

## 2021-04-05 PROCEDURE — 93312 ECHO TRANSESOPHAGEAL: CPT

## 2021-04-05 PROCEDURE — 82948 REAGENT STRIP/BLOOD GLUCOSE: CPT

## 2021-04-05 PROCEDURE — 81241 F5 GENE: CPT | Performed by: PSYCHIATRY & NEUROLOGY

## 2021-04-05 RX ORDER — LIDOCAINE HYDROCHLORIDE 10 MG/ML
INJECTION, SOLUTION EPIDURAL; INFILTRATION; INTRACAUDAL; PERINEURAL AS NEEDED
Status: DISCONTINUED | OUTPATIENT
Start: 2021-04-05 | End: 2021-04-05

## 2021-04-05 RX ORDER — FENTANYL CITRATE/PF 50 MCG/ML
25 SYRINGE (ML) INJECTION
Status: DISCONTINUED | OUTPATIENT
Start: 2021-04-05 | End: 2021-04-06

## 2021-04-05 RX ORDER — PROPOFOL 10 MG/ML
INJECTION, EMULSION INTRAVENOUS CONTINUOUS PRN
Status: DISCONTINUED | OUTPATIENT
Start: 2021-04-05 | End: 2021-04-05

## 2021-04-05 RX ORDER — ONDANSETRON 2 MG/ML
4 INJECTION INTRAMUSCULAR; INTRAVENOUS ONCE AS NEEDED
Status: DISCONTINUED | OUTPATIENT
Start: 2021-04-05 | End: 2021-04-06 | Stop reason: HOSPADM

## 2021-04-05 RX ORDER — SODIUM CHLORIDE, SODIUM LACTATE, POTASSIUM CHLORIDE, CALCIUM CHLORIDE 600; 310; 30; 20 MG/100ML; MG/100ML; MG/100ML; MG/100ML
INJECTION, SOLUTION INTRAVENOUS CONTINUOUS PRN
Status: DISCONTINUED | OUTPATIENT
Start: 2021-04-05 | End: 2021-04-05

## 2021-04-05 RX ORDER — PROPOFOL 10 MG/ML
INJECTION, EMULSION INTRAVENOUS AS NEEDED
Status: DISCONTINUED | OUTPATIENT
Start: 2021-04-05 | End: 2021-04-05

## 2021-04-05 RX ADMIN — LIDOCAINE HYDROCHLORIDE 50 MG: 10 INJECTION, SOLUTION EPIDURAL; INFILTRATION; INTRACAUDAL; PERINEURAL at 11:04

## 2021-04-05 RX ADMIN — PROPOFOL 30 MG: 10 INJECTION, EMULSION INTRAVENOUS at 11:12

## 2021-04-05 RX ADMIN — PROPOFOL 30 MG: 10 INJECTION, EMULSION INTRAVENOUS at 11:16

## 2021-04-05 RX ADMIN — LABETALOL 20 MG/4 ML (5 MG/ML) INTRAVENOUS SYRINGE 10 MG: at 00:06

## 2021-04-05 RX ADMIN — PROPOFOL 120 MCG/KG/MIN: 10 INJECTION, EMULSION INTRAVENOUS at 11:07

## 2021-04-05 RX ADMIN — PNEUMOCOCCAL 13-VALENT CONJUGATE VACCINE 0.5 ML: 2.2; 2.2; 2.2; 2.2; 2.2; 4.4; 2.2; 2.2; 2.2; 2.2; 2.2; 2.2; 2.2 INJECTION, SUSPENSION INTRAMUSCULAR at 14:41

## 2021-04-05 RX ADMIN — PROPOFOL 30 MG: 10 INJECTION, EMULSION INTRAVENOUS at 11:14

## 2021-04-05 RX ADMIN — SERTRALINE HYDROCHLORIDE 50 MG: 50 TABLET ORAL at 14:19

## 2021-04-05 RX ADMIN — PROPOFOL 30 MG: 10 INJECTION, EMULSION INTRAVENOUS at 11:10

## 2021-04-05 RX ADMIN — ASPIRIN 81 MG: 81 TABLET, COATED ORAL at 08:46

## 2021-04-05 RX ADMIN — PROPOFOL 100 MG: 10 INJECTION, EMULSION INTRAVENOUS at 11:05

## 2021-04-05 RX ADMIN — AMLODIPINE BESYLATE 10 MG: 10 TABLET ORAL at 08:45

## 2021-04-05 RX ADMIN — HYDRALAZINE HYDROCHLORIDE 50 MG: 25 TABLET ORAL at 04:46

## 2021-04-05 RX ADMIN — LABETALOL 20 MG/4 ML (5 MG/ML) INTRAVENOUS SYRINGE 10 MG: at 10:45

## 2021-04-05 RX ADMIN — HYDRALAZINE HYDROCHLORIDE 50 MG: 25 TABLET ORAL at 21:54

## 2021-04-05 RX ADMIN — PROPOFOL 100 MG: 10 INJECTION, EMULSION INTRAVENOUS at 11:06

## 2021-04-05 RX ADMIN — PROPOFOL 80 MG: 10 INJECTION, EMULSION INTRAVENOUS at 11:07

## 2021-04-05 RX ADMIN — HEPARIN SODIUM 5000 UNITS: 5000 INJECTION INTRAVENOUS; SUBCUTANEOUS at 14:19

## 2021-04-05 RX ADMIN — HEPARIN SODIUM 5000 UNITS: 5000 INJECTION INTRAVENOUS; SUBCUTANEOUS at 21:54

## 2021-04-05 RX ADMIN — PROPOFOL 30 MG: 10 INJECTION, EMULSION INTRAVENOUS at 11:18

## 2021-04-05 RX ADMIN — LABETALOL 20 MG/4 ML (5 MG/ML) INTRAVENOUS SYRINGE 10 MG: at 18:14

## 2021-04-05 RX ADMIN — ATORVASTATIN CALCIUM 40 MG: 40 TABLET, FILM COATED ORAL at 18:06

## 2021-04-05 RX ADMIN — SODIUM CHLORIDE, SODIUM LACTATE, POTASSIUM CHLORIDE, AND CALCIUM CHLORIDE: .6; .31; .03; .02 INJECTION, SOLUTION INTRAVENOUS at 10:51

## 2021-04-05 RX ADMIN — HYDRALAZINE HYDROCHLORIDE 50 MG: 25 TABLET ORAL at 14:19

## 2021-04-05 RX ADMIN — HEPARIN SODIUM 5000 UNITS: 5000 INJECTION INTRAVENOUS; SUBCUTANEOUS at 04:48

## 2021-04-05 RX ADMIN — PROPOFOL 20 MG: 10 INJECTION, EMULSION INTRAVENOUS at 11:08

## 2021-04-05 NOTE — PROGRESS NOTES
[address]  Progress Note Hao Martinez 1970, 45 y o  male MRN: [id number]  Unit/Bed#: -01 Encounter: [id number]  Primary Care Provider: No primary care provider on file  Date and time admitted to hospital: 4/1/2021 12:23 PM    * Hypertensive urgency  Assessment & Plan  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 as well as marijuana - denies recent use however may contribute to his BP  · CT head: No evidence of acute infarct or intracranial hemorrhage   Patchy areas of microvascular ischemic change with small lacunar infarcts   Asymmetric right transverse and sigmoid sinus commonly can be a normal variant  · Renal following and appreciate their input  BPs improving with recent adjustments  · Continue amlodipine 10 mg daily, hydralazine 50 mg every 8 hours  · TTE significant for left ventricular hypertrophy likely secondary to long-standing uncontrolled hypertension    Current moderate episode of major depressive disorder without prior episode (HCC)  Assessment & Plan  · Start Zoloft 50 mg daily  Acute CVA (cerebrovascular accident) [facility])  Assessment & Plan  · Patient presenting to the ED with 3-week history of slurred speech and gait disturbance  · On presentation, patient with hypertensive urgency as well as SRIRAM  · CT head negative for acute inchemia however showing multiple lacunar infarcts  · MRI brain: a few small foci of diffusion restriction bilateral cerebral hemispheres in 2 separate vascular territories, worrisome for acute embolic infarctions  · ECHO: EF 65% with grade 1 diastolic dysfunction  · CHRISTIANNE: No evidence of PFO or valvular vegetations    · Stroke pathway initiated  · Patient was already started on statin for elevated ascvd risk however increased dose to 40  · Started patient on aspirin  · Neurology consulted and appreciate their recommendations  · Obtain carotid doppler  · Patient will need placement of loop recorder to evaluate for underlying atrial fibrillation  CM has arranged an appointment for him on 04/09/2021    · Hypercoagulable panel: pending  · CT C/A/P with/without contrast to assess for any maligancy - no apparent malignancy noted on CT chest abdomen pelvis, however patient did have some pulmonary nodules with recommendation to for follow-up CT scan in 3 months  · Lower extremity venous dopplers negative for DVTs      Tobacco use  Assessment & Plan  · Admits to heavy smoking - 36 pack year smoker   · Offered nicotine patches however patient refused  · Counseled on smoking cessation    Drug use  Assessment & Plan  · Patient admits to using meth and marijuana  · Denies recent use    Elevated troponin level not due to acute coronary syndrome  Assessment & Plan  · Troponin elevated to 0 72 on admission, denies any chest pain however does have shortness of breath  · Troponin elevation in setting of significant hypertension, possibly secondary to demand  · TTE showing normal EF and concentric LVH likely secondary to longstanding HTN  · Given CVA, patient started on Atorvastatin 40 mg qhs  · Serial EKG - non-ischemic    Acute kidney injury superimposed on chronic kidney disease (HCC)  Assessment & Plan  · Baseline creatinine: 1 3-1 5  · Patient has not seen any provider in the last 10 years and has not been on any maintenance medications  · Kidney and bladder ultrasound: normal  · Avoid nephrotoxins  · Avoid hypotensive episodes  · Nephrology consulted for optimization of renal function prior to imaging with contrast for embolic stroke work-up, recommendafter imaging; patient not on ACEi/ARB, patient not on any diureticsations appreciated - patient currently on fluids at this time and will receive a bolus prior to CT scan then will hydrate patient again after CT scan and maintain him on IVF     VTE Pharmacologic Prophylaxis:   Pharmacologic: Heparin  Mechanical: Mechanical VTE prophylaxis in place  Patient Centered Rounds: I have performed bedside rounds with nursing staff today  Discussions with Specialists or Other Care Team Provider: None  Education and Discussions with Family / Patient: All patient questions answered to the best of my ability  Time Spent for Care: 30 minutes  More than 50% of total time spent on counseling and coordination of care as described above  Current Length of Stay: 4 day(s)  Current Patient Status: Inpatient   Certification Statement: The patient will continue to require additional inpatient hospital stay due to pending neurology evaluation  Discharge Plan: Patient is not yet medically stable for discharge  Await neurology recommendations  Code Status: Level 1 - Full Code    Subjective:   Patient is tearful at times reporting he is depressed but denies any SI/HI  He is agreeable to starting Zoloft for mood stabilization  Currently, he denies any weakness, dizziness or headaches  Objective:   Vitals:   Temp (24hrs), Av 9 °F (36 6 °C), Min:97 3 °F (36 3 °C), Max:98 9 °F (37 2 °C)    Temp:  [97 3 °F (36 3 °C)-98 9 °F (37 2 °C)] 97 8 °F (36 6 °C)  HR:  [57-84] 60  Resp:  [14-31] 19  BP: ()/() 151/81  SpO2:  [94 %-99 %] 96 %  Body mass index is 30 28 kg/m²  Input and Output Summary (last 24 hours): Intake/Output Summary (Last 24 hours) at 2021 1445  Last data filed at 2021 1123  Gross per 24 hour   Intake 960 ml   Output 1150 ml   Net -190 ml       Physical Exam:     Physical Exam  Vitals signs reviewed  HENT:      Head: Normocephalic and atraumatic  Eyes:      General: No scleral icterus  Cardiovascular:      Rate and Rhythm: Normal rate and regular rhythm  Heart sounds: No murmur     Pulmonary: Breath sounds: Normal breath sounds  No wheezing or rales  Chest:      Chest wall: No tenderness  Abdominal:      General: Bowel sounds are normal  There is no distension  Palpations: Abdomen is soft  Tenderness: There is no abdominal tenderness  Musculoskeletal: Normal range of motion  Skin:     General: Skin is warm and dry  Findings: No rash  Additional Data:   Labs:  Results from last 7 days   Lab Units 04/04/21  0426   WBC Thousand/uL 10 83*   HEMOGLOBIN g/dL 16 5   HEMATOCRIT % 48 6   PLATELETS Thousands/uL 299   NEUTROS PCT % 50   LYMPHS PCT % 39   MONOS PCT % 7   EOS PCT % 2     Results from last 7 days   Lab Units 04/05/21  0434  04/02/21  0603   POTASSIUM mmol/L 3 9   < > 3 6   CHLORIDE mmol/L 104   < > 102   CO2 mmol/L 26   < > 29   BUN mg/dL 15   < > 17   CREATININE mg/dL 1 27   < > 1 38*   CALCIUM mg/dL 8 9   < > 8 7   ALK PHOS U/L  --   --  65   ALT U/L  --   --  33   AST U/L  --   --  19    < > = values in this interval not displayed  Results from last 7 days   Lab Units 04/01/21  1231   INR  0 89       * I Have Reviewed All Lab Data Listed Above  * Additional Pertinent Lab Tests Reviewed:  All Labs Within Last 24 Hours Reviewed    Imaging:    Imaging Reports Reviewed Today Include: None new    Cultures:   Blood Culture: No results found for: BLOODCX  Urine Culture: No results found for: URINECX  Sputum Culture: No components found for: SPUTUMCX  Wound Culture: No results found for: WOUNDCULT    Last 24 Hours Medication List:   Current Facility-Administered Medications   Medication Dose Route Frequency Provider Last Rate    amLODIPine  10 mg Oral Daily Jarrell Payne MD      aspirin  81 mg Oral Daily Jarrell Payne MD      atorvastatin  40 mg Oral QPM Jarrell Maldonado MD      fentaNYL  25 mcg Intravenous Q3 min PRN Shirley Hill CRNA      heparin (porcine)  5,000 Units Subcutaneous [facility] [address] [facility] [address]  [facility] Chiara Maldonado MD      hydrALAZINE  50 mg Oral [facility] PEEWEE Marino      Labetalol HCl  10 mg Intravenous Q6H PRN Merry Maldonado MD      ondansetron  4 mg Intravenous Once PRN Jose Jacob CRNA      pneumococcal 13-valent conjugate vaccine  0 5 mL Intramuscular Prior to discharge Tito Kimble PA-C      sertraline  50 mg Oral Daily Tito Kimble PA-C          Today, Patient Was Seen By: Tito Kimble PA-C    ** Please Note: Dragon 360 Dictation voice to text software may have been used in the creation of this document   **

## 2021-04-05 NOTE — TELEMEDICINE
TeleConsultation - Neurology   Daiana Echevarria 45 y o  male MRN: [id number]  Unit/Bed#: -33 Encounter: [id number]      REQUIRED DOCUMENTATION:     1  This service was provided via Telemedicine  2  Provider located at [facility]  3  TeleMed provider: Home Hernandez DO   4  Identify all parties in room with patient during tele consult:  Patient  5  After connecting through televideo, patient was identified by name and date of birth and assistant checked wristband  Patient was then informed that this was a Telemedicine visit and that the exam was being conducted confidentially over secure lines  My office door was closed  No one else was in the room  Patient acknowledged consent and understanding of privacy and security of the Telemedicine visit, and gave us permission to have the assistant stay in the room in order to assist with the history and to conduct the exam   I informed the patient that I have reviewed their record in Epic and presented the opportunity for them to ask any questions regarding the visit today  The patient agreed to participate  Assessment/Plan       Bilateral frontal ischemic infarcts - ? Embolic etiology  -  Stroke risk factors including uncontrolled hypertension - patient has not seen a PCP in years, methamphetamine use, hyperlipidemia, tobacco abuse  Secondary stroke prevention: Aspirin 81 mg and statin  I discussed at length the risk factor modification for stroke with patient including stopping methamphetamine use,  Better blood pressure control- defer to primary team starting antihypertensives at this point is been over 48 hours since patient has acute stroke symptoms this okay to have goal normotension, recommend tobacco cessation, recommend better diet for controlling hyperlipidemia and borderline elevated hyperglycemia  CHRISTIANNE does not show any significant abnormality  However do recommend loop recorder or ZIO patch given bilateral ischemic infarcts      - Recommend checking thrombosis panel with family hx of stroke and clotting disorder in mom  - Check CUS  - Venous dopplers bl LE- no thrombus    Depression     patient tearful during my exam today  Denies any suicidal ideation  He does wish to see Psychiatry as well as speak to a counselor  I discussed with the primary team   I do recommend starting Zoloft or another SSRI  Low-dose, patient to follow-up with PCP outpatient  If Sudden onset of one-sided weakness, facial droop, speech changes, vision loss, significant dizziness patient was encouraged call 911 or go to the nearest ER as these can be signs of acute stroke  D/w primary team AP Ellyn Klinefelter will need follow up in in 4 weeks with neurovascular attending or advance practitioner  He will not require outpatient neurological testing  Thrombosis panel should be followed up on  Will need ziopatch or loop recorder monitoring   However patient does not have insurance at this, primary team is discussing options with case management for outpatient follow-up    History of Present Illness     Reason for Consult / Principal Problem: stroke  Hx and PE limited by: teleconsult  HPI: Ajit Vargas is a 45 y o  right handed male who presents with     Mr  Ajit Vargas  A 50-year-old male, with history of methamphetamine abuse, UDS + for this on admit,  Suspect hypertension-patient has not seen a PCP in years, tobacco abuse, hyperlipidemia noted on workup today seen in consultation for stroke-like symptoms  Patient states that he has had 2 weeks dysarthria and slightly altered gait  States he attributed his dysarthria to a feeling that he had just gotten done however when he went to his dentist told him that this is not the cause of this problem thus came to the ER  He denies any prior history similar, denies prior history of known stroke  Denies any blood clots  Denies any cardiac disease, chest pain or heart palpitations        Does state that he feels a little better than when he came in especially with his speech  He states speech therapy saw him and did recommend outpatient speech therapy  He was not taking an aspirin or any other medication at home  He denies any one-sided weakness or vision changes or dizziness  I  reviewed his MRI brain in PACS and discussed it with the patient,  Does have ischemic infarcts in bilateral cerebral hemispheres  CHRISTIANNE completed earlier today with no evidence of intracardiac  Thrombus or reduced EF or left atrial dilatation or PFO  Essentially normal         Patient has been hypertensive here up to 409 systolic  No fevers and nontoxic appearing  Family history of mom with clotting disorder and 3 strokes per him  Inpatient consult to Neurology  [facility],   Consult ordered by: Berry Givens MD       Review of Systems   10 point review of systems completed and negative other than that noted above  Historical Information   Past Medical History:   Diagnosis Date    Gout     Hypertension      History reviewed  No pertinent surgical history  Social History   Social History     Substance and Sexual Activity   Alcohol Use Yes     Social History     Substance and Sexual Activity   Drug Use Yes    Types: Marijuana     E-Cigarette/Vaping    E-Cigarette Use Never User      E-Cigarette/Vaping Substances     Social History     Tobacco Use   Smoking Status Current Some Day Smoker   Smokeless Tobacco Never Used     Family History: as above    Review of previous medical records was completed  Meds/Allergies   all current active meds have been reviewed    No Known Allergies    Objective   Vitals:Blood pressure 148/85, pulse 60, temperature 97 8 °F (36 6 °C), resp  rate 19, height 5' 10" (1 778 m), weight 95 7 kg (211 lb), SpO2 95 %  ,Body mass index is 30 28 kg/m²      Intake/Output Summary (Last 24 hours) at 4/5/2021 1341  Last data filed at 4/5/2021 1123  Gross per 24 hour   Intake 960 ml   Output 1150 ml   Net -190 ml       Invasive Devices: Invasive Devices     Peripheral Intravenous Line            Peripheral IV 04/03/21 Left;Ventral (anterior) Forearm 2 days    Peripheral IV 04/05/21 Dorsal (posterior); Right Hand less than 1 day                Physical Exam  Neurologic Exam      Modified PE as this is a video consultation:  Gen:   NAD  HEENT:  No Septal deviation EOMI NCAT  Resp:  Symmetric chest rise and patient in no obvious respiratory distress  MSK: ROM normal  Skin: No rash noted in visualized portion of this exam    Neuroexam:  AO X 4  No aphasia  Minimal dysarthria noted  Patient is able to follow 2 and 3 step commands with ease  CN 2-12 grossly intact including hearing intact to conversation, V1-3 done with help of patient touching her own face in those distributions as instructed normal to LT, no facial asymmetry with eye brow raise or smile, EOMI, hearing intact to conversation no tongue deviation, symmetric shoulder shrug and side bending and neck rotation, pupils symmetric  Motor:  Intact antigravity X 4 extremities  No Pronator drift noted  Sensation: Intact to light touch, instructed patient on how to do this in all extremities proximal and distal   Cerebellar: No dysmetria b/l with FNF testing  LYNN with no dysdiadochokinesia  Gait:  Romberg with wild sway      Lab Results: I have personally reviewed pertinent reports  Imaging Studies: I have personally reviewed pertinent films in PACS  EKG, Pathology, and Other Studies: I have personally reviewed pertinent reports          Code Status: Level 1 - Full Code  Advance Directive and Living Will:      Power of :    POLST:      Counseling / Coordination of Care    Total time spent with direct patient care at least 35 minutes an additional 10-15 minutes was spent in reviewing chart including reviewing imaging PACS and discussing case with primary team

## 2021-04-05 NOTE — ASSESSMENT & PLAN NOTE
· Troponin elevated to 0 72 on admission, denies any chest pain however does have shortness of breath  · Troponin elevation in setting of significant hypertension, possibly secondary to demand  · TTE showing normal EF and concentric LVH likely secondary to longstanding HTN  · Given CVA, patient started on Atorvastatin 40 mg qhs  · Serial EKG - non-ischemic

## 2021-04-05 NOTE — CONSULTS
Consult received for stroke pathway  Pt with elevated triglycerides and A1C levels  Pt is noted to be obese  Pt reports no chewing/swallowing difficulties  Attempted to obtain 24 h diet recall with pt, but kept repeating "I eat whatever" after RD trying to obtain further information, pt could not describe typical intake  Reports not following a special diet at home  Provided diet ed for weight management, healthful diet, stated "this is all new to me " Did not have qeustions at this time  Will reinforce diet ed at follow up  Advance diet to cardiac diet as medically appropriate  Provided information on Mediterranean Diet, MyPlate  Will provide additional information on high triglycerides nutrition therapy and 1800 kcal diet plan for weight loss

## 2021-04-05 NOTE — ASSESSMENT & PLAN NOTE
· Patient presenting to the ED with 3-week history of slurred speech and gait disturbance  · On presentation, patient with hypertensive urgency as well as SRIRAM  · CT head negative for acute inchemia however showing multiple lacunar infarcts  · MRI brain: a few small foci of diffusion restriction bilateral cerebral hemispheres in 2 separate vascular territories, worrisome for acute embolic infarctions  · ECHO: EF 65% with grade 1 diastolic dysfunction  · CHRISTIANNE: No evidence of PFO or valvular vegetations  · Stroke pathway initiated  · Patient was already started on statin for elevated ascvd risk however increased dose to 40  · Started patient on aspirin  · Neurology consulted and appreciate their recommendations  · Obtain carotid doppler  · Patient will need placement of loop recorder to evaluate for underlying atrial fibrillation  CM has arranged an appointment for him on 04/09/2021  · Hypercoagulable panel: pending  · CT C/A/P with/without contrast to assess for any maligancy - no apparent malignancy noted on CT chest abdomen pelvis, however patient did have some pulmonary nodules with recommendation to for follow-up CT scan in 3 months  · Lower extremity venous dopplers negative for DVTs

## 2021-04-05 NOTE — NURSING NOTE
Assumed care of pt at this time, post procedure  Pt alert and oriented, no c/o pain  Pt resting in bed, call bell at pt's side  Will continue to monitor

## 2021-04-05 NOTE — PLAN OF CARE
Problem: PHYSICAL THERAPY ADULT  Goal: Performs mobility at highest level of function for planned discharge setting  See evaluation for individualized goals  Description: Treatment/Interventions: Functional transfer training, LE strengthening/ROM, Elevations, Therapeutic exercise, Endurance training, Patient/family training, Equipment eval/education, Bed mobility, Gait training, Compensatory technique education, Spoke to nursing  Equipment Recommended: (none anticipated)       See flowsheet documentation for full assessment, interventions and recommendations  Outcome: Progressing  Note: Prognosis: Excellent  Problem List: Decreased strength, Decreased endurance, Impaired balance, Decreased mobility  Assessment: Pt seen for PT treatment session this date with interventions consisting of gait training w/ emphasis on improving pt's ability to ambulate level surfaces x 400' with close S provided by therapist with no AD and neuromuscular re-education: tandem forward and single limb stance with U/L UE support and gait with head turns/nods x 150' all with SBA for safety  Pt agreeable to PT treatment session upon arrival, pt found ambulating to BR, in no apparent distress  In comparison to previous session, pt with improvements in activity tolerance as evidenced by pt able to ambulate increased distance, pt able to participate in dynamic balance training with 1 LOB with pt able to self-correct without assistance  Pt continues to demonstrate decreased ambulatory and dynamic standing balance with path deviations  Post session: pt returned BTB, all needs in reach and RN notified of session findings/recommendations Continue to recommend OP PT at time of d/c in order to maximize pt's functional independence and safety w/ mobility  Pt continues to be functioning below baseline level, and remains limited 2* factors listed above and including decreased strength, balance, mobility, and acitivity tolerance   PT will continue to see pt while here in order to address the deficits listed above and provide interventions consistent w/ POC in effort to achieve STGs  Barriers to Discharge: None     PT Discharge Recommendation: Return to previous environment with social support, Other (Comment)(OPPT)     PT - OK to Discharge: (when medically clear)    See flowsheet documentation for full assessment

## 2021-04-05 NOTE — ANESTHESIA PREPROCEDURE EVALUATION
Procedure:  CHRISTIANNE    Acute CVA presented 4/1 for 3 wk hx speech and gait disturbance with HTN urgency and SRIRAM improving  Had elevated tropinin on presentation likely secondary to HTN  TTE: EF 65% with G1DD, RV wnl, no significant valve disease  Snores loudly at home thinks he may have ALDAIR but hasn't been worked up  Denies the following: CP/SOB with exertion, asthma, COPD,       Relevant Problems   CARDIO   (+) Hyperlipidemia   (+) Hypertension   (+) Hypertensive urgency      /RENAL   (+) Acute kidney injury ([facility] [address] )      NEURO/PSYCH   (+) Acute CVA (cerebrovascular accident) [facility])        Physical Exam    Airway    Mallampati score: I  TM Distance: >3 FB  Neck ROM: full     Dental       Cardiovascular      Pulmonary      Other Findings        Anesthesia Plan  ASA Score- 4     Anesthesia Type- IV sedation with anesthesia with ASA Monitors  Additional Monitors:   Airway Plan:           Plan Factors-Exercise tolerance (METS): >4 METS  Chart reviewed  EKG reviewed  Existing labs reviewed  Patient summary reviewed  Obstructive sleep apnea risk education given perioperatively  Induction-     Postoperative Plan-     Informed Consent- Anesthetic plan and risks discussed with patient  I personally reviewed this patient with the CRNA  Discussed and agreed on the Anesthesia Plan with the CRNA  Remberto Crain

## 2021-04-05 NOTE — PLAN OF CARE
Problem: Potential for Falls  Goal: Patient will remain free of falls  Description: INTERVENTIONS:  - Assess patient frequently for physical needs  -  Identify cognitive and physical deficits and behaviors that affect risk of falls    -  [place] fall precautions as indicated by assessment   - Educate patient/family on patient safety including physical limitations  - Instruct patient to call for assistance with activity based on assessment  - Modify environment to reduce risk of injury, call bell within reach  - Consider OT/PT consult to assist with strengthening/mobility  Outcome: Progressing     Problem: CARDIOVASCULAR - ADULT  Goal: Maintains optimal cardiac output and hemodynamic stability  Description: INTERVENTIONS:  - Monitor I/O, vital signs and rhythm  - Monitor for S/S and trends of decreased cardiac output  - Administer and titrate ordered vasoactive medications to optimize hemodynamic stability  - Assess quality of pulses, skin color and temperature  - Assess for signs of decreased coronary artery perfusion  - Instruct patient to report change in severity of symptoms  Outcome: Progressing  Goal: Absence of cardiac dysrhythmias or at baseline rhythm  Description: INTERVENTIONS:  - Continuous cardiac monitoring, vital signs, obtain 12 lead EKG if ordered  - Administer antiarrhythmic and heart rate control medications as ordered  - Monitor electrolytes and administer replacement therapy as ordered  Outcome: Progressing     Problem: PAIN - ADULT  Goal: Verbalizes/displays adequate comfort level or baseline comfort level  Description: Interventions:  - Encourage patient to monitor pain and request assistance  - Assess pain using appropriate pain scale  - Administer analgesics based on type and severity of pain and evaluate response  - Implement non-pharmacological measures as appropriate and evaluate response  - Consider cultural and social influences on pain and pain management  - Notify physician/advanced practitioner if interventions unsuccessful or patient reports new pain  Outcome: Progressing     Problem: INFECTION - ADULT  Goal: Absence or prevention of progression during hospitalization  Description: INTERVENTIONS:  - Assess and monitor for signs and symptoms of infection  - Monitor lab/diagnostic results  - Monitor all insertion sites, i e  indwelling lines, tubes, and drains  - Monitor endotracheal if appropriate and nasal secretions for changes in amount and color  - [place] appropriate cooling/warming therapies per order  - Administer medications as ordered  - Instruct and encourage patient and family to use good hand hygiene technique  - Identify and instruct in appropriate isolation precautions for identified infection/condition  Outcome: Progressing  Goal: Absence of fever/infection during neutropenic period  Description: INTERVENTIONS:  - Monitor WBC    Outcome: Progressing     Problem: SAFETY ADULT  Goal: Patient will remain free of falls  Description: INTERVENTIONS:  - Assess patient frequently for physical needs  -  Identify cognitive and physical deficits and behaviors that affect risk of falls    -  [place] fall precautions as indicated by assessment   - Educate patient/family on patient safety including physical limitations  - Instruct patient to call for assistance with activity based on assessment  - Modify environment to reduce risk of injury, call bell within reach  - Consider OT/PT consult to assist with strengthening/mobility  Outcome: Progressing  Goal: Maintain or return to baseline ADL function  Description: INTERVENTIONS:  -  Assess patient's ability to carry out ADLs; assess patient's baseline for ADL function and identify physical deficits which impact ability to perform ADLs (bathing, care of mouth/teeth, toileting, grooming, dressing, etc )  - Assess/evaluate cause of self-care deficits   - Assess range of motion  - Assess patient's mobility; develop plan if impaired  - Assess patient's need for assistive devices and provide as appropriate  - Encourage maximum independence but intervene and supervise when necessary  - Involve family in performance of ADLs  - Assess for home care needs following discharge   - Consider OT consult to assist with ADL evaluation and planning for discharge  - Provide patient education as appropriate  Outcome: Progressing  Goal: Maintain or return mobility status to optimal level  Description: INTERVENTIONS:  - Assess patient's baseline mobility status (ambulation, transfers, stairs, etc )    - Identify cognitive and physical deficits and behaviors that affect mobility  - Identify mobility aids required to assist with transfers and/or ambulation (gait belt, sit-to-stand, lift, walker, cane, etc )  - [place] fall precautions as indicated by assessment  - Record patient progress and toleration of activity level on Mobility SBAR; progress patient to next Phase/Stage  - Instruct patient to call for assistance with activity based on assessment  - Consider rehabilitation consult to assist with strengthening/weightbearing, etc   Outcome: Progressing     Problem: DISCHARGE PLANNING  Goal: Discharge to home or other facility with appropriate resources  Description: INTERVENTIONS:  - Identify barriers to discharge w/patient and caregiver  - Arrange for needed discharge resources and transportation as appropriate  - Identify discharge learning needs (meds, wound care, etc )  - Arrange for interpretive services to assist at discharge as needed  - Refer to Case Management Department for coordinating discharge planning if the patient needs post-hospital services based on physician/advanced practitioner order or complex needs related to functional status, cognitive ability, or social support system  Outcome: Progressing     Problem: Knowledge Deficit  Goal: Patient/family/caregiver demonstrates understanding of disease process, treatment plan, medications, and discharge instructions  Description: Complete learning assessment and assess knowledge base  Interventions:  - Provide teaching at level of understanding  - Provide teaching via preferred learning methods  Outcome: Progressing     Problem: NEUROSENSORY - ADULT  Goal: Achieves stable or improved neurological status  Description: INTERVENTIONS  - Monitor and report changes in neurological status  - Monitor vital signs such as temperature, blood pressure, glucose, and any other labs ordered   - Initiate measures to prevent increased intracranial pressure  - Monitor for seizure activity and implement precautions if appropriate      Outcome: Progressing  Goal: Achieves maximal functionality and self care  Description: INTERVENTIONS  - Monitor swallowing and airway patency with patient fatigue and changes in neurological status, monitor speech  - Encourage and assist patient to increase activity and self care     - Encourage visually impaired, hearing impaired and aphasic patients to use assistive/communication devices  Outcome: Progressing     Problem: RESPIRATORY - ADULT  Goal: Achieves optimal ventilation and oxygenation  Description: INTERVENTIONS:  - Assess for changes in respiratory status  - Assess for changes in mentation and behavior  - Position to facilitate oxygenation and minimize respiratory effort  - Oxygen administered by appropriate delivery if ordered  - Initiate smoking cessation education as indicated  - Encourage broncho-pulmonary hygiene including cough, deep breathe, Incentive Spirometry  - Assess the need for suctioning and aspirate as needed  - Assess and instruct to report SOB or any respiratory difficulty  - Respiratory Therapy support as indicated  Outcome: Progressing     Problem: GASTROINTESTINAL - ADULT  Goal: Minimal or absence of nausea and/or vomiting  Description: INTERVENTIONS:  - Administer IV fluids if ordered to ensure adequate hydration  - Maintain NPO status until nausea and vomiting are resolved  - Nasogastric tube if ordered  - Administer ordered antiemetic medications as needed  - Provide nonpharmacologic comfort measures as appropriate  - Advance diet as tolerated, if ordered  - Consider nutrition services referral to assist patient with adequate nutrition and appropriate food choices  Outcome: Progressing  Goal: Maintains or returns to baseline bowel function  Description: INTERVENTIONS:  - Assess bowel function  - Encourage oral fluids to ensure adequate hydration  - Administer IV fluids if ordered to ensure adequate hydration  - Administer ordered medications as needed  - Encourage mobilization and activity  - Consider nutritional services referral to assist patient with adequate nutrition and appropriate food choices  Outcome: Progressing  Goal: Maintains adequate nutritional intake  Description: INTERVENTIONS:  - Monitor percentage of each meal consumed  - Identify factors contributing to decreased intake, treat as appropriate  - Assist with meals as needed  - Monitor I&O, weight, and lab values if indicated  - Obtain nutrition services referral as needed  Outcome: Progressing  Goal: Establish and maintain optimal ostomy function  Description: INTERVENTIONS:  - Assess bowel function  - Encourage oral fluids to ensure adequate hydration  - Administer IV fluids if ordered to ensure adequate hydration   - Administer ordered medications as needed  - Encourage mobilization and activity  - Nutrition services referral to assist patient with appropriate food choices  - Assess stoma site  - Consider wound care consult   Outcome: Progressing     Problem: GENITOURINARY - ADULT  Goal: Maintains or returns to baseline urinary function  Description: INTERVENTIONS:  - Assess urinary function  - Encourage oral fluids to ensure adequate hydration if ordered  - Administer IV fluids as ordered to ensure adequate hydration  - Administer ordered medications as needed  - Offer frequent toileting  - Follow urinary retention protocol if ordered  Outcome: Progressing  Goal: Absence of urinary retention  Description: INTERVENTIONS:  - Assess patients ability to void and empty bladder  - Monitor I/O  - Bladder scan as needed  - Discuss with physician/AP medications to alleviate retention as needed  - Discuss catheterization for long term situations as appropriate  Outcome: Progressing  Goal: Urinary catheter remains patent  Description: INTERVENTIONS:  - Assess patency of urinary catheter  - If patient has a chronic quezada, consider changing catheter if non-functioning  - Follow guidelines for intermittent irrigation of non-functioning urinary catheter  Outcome: Progressing     Problem: METABOLIC, FLUID AND ELECTROLYTES - ADULT  Goal: Electrolytes maintained within normal limits  Description: INTERVENTIONS:  - Monitor labs and assess patient for signs and symptoms of electrolyte imbalances  - Administer electrolyte replacement as ordered  - Monitor response to electrolyte replacements, including repeat lab results as appropriate  - Instruct patient on fluid and nutrition as appropriate  Outcome: Progressing  Goal: Fluid balance maintained  Description: INTERVENTIONS:  - Monitor labs   - Monitor I/O and WT  - Instruct patient on fluid and nutrition as appropriate  - Assess for signs & symptoms of volume excess or deficit  Outcome: Progressing  Goal: Glucose maintained within target range  Description: INTERVENTIONS:  - Monitor Blood Glucose as ordered  - Assess for signs and symptoms of hyperglycemia and hypoglycemia  - Administer ordered medications to maintain glucose within target range  - Assess nutritional intake and initiate nutrition service referral as needed  Outcome: Progressing     Problem: SKIN/TISSUE INTEGRITY - ADULT  Goal: Skin integrity remains intact  Description: INTERVENTIONS  - Identify patients at risk for skin breakdown  - Assess and monitor skin integrity  - Assess and monitor nutrition and hydration status  - Monitor labs (i e  albumin)  - Assess for incontinence   - Turn and reposition patient  - Assist with mobility/ambulation  - Relieve pressure over bony prominences  - Avoid friction and shearing  - Provide appropriate hygiene as needed including keeping skin clean and dry  - Evaluate need for skin moisturizer/barrier cream  - Collaborate with interdisciplinary team (i e  Nutrition, Rehabilitation, etc )   - Patient/family teaching  Outcome: Progressing  Goal: Incision(s), wounds(s) or drain site(s) healing without S/S of infection  Description: INTERVENTIONS  - Assess and document risk factors for skin impairment   - Assess and document dressing, incision, wound bed, drain sites and surrounding tissue  - Consider nutrition services referral as needed  - Oral mucous membranes remain intact  - Provide patient/ family education  Outcome: Progressing  Goal: Oral mucous membranes remain intact  Description: INTERVENTIONS  - Assess oral mucosa and hygiene practices  - Implement preventative oral hygiene regimen  - Implement oral medicated treatments as ordered  - Initiate Nutrition services referral as needed  Outcome: Progressing     Problem: HEMATOLOGIC - ADULT  Goal: Maintains hematologic stability  Description: INTERVENTIONS  - Assess for signs and symptoms of bleeding or hemorrhage  - Monitor labs  - Administer supportive blood products/factors as ordered and appropriate  Outcome: Progressing     Problem: MUSCULOSKELETAL - ADULT  Goal: Maintain or return mobility to safest level of function  Description: INTERVENTIONS:  - Assess patient's ability to carry out ADLs; assess patient's baseline for ADL function and identify physical deficits which impact ability to perform ADLs (bathing, care of mouth/teeth, toileting, grooming, dressing, etc )  - Assess/evaluate cause of self-care deficits   - Assess range of motion  - Assess patient's mobility  - Assess patient's need for assistive devices and provide as appropriate  - Encourage maximum independence but intervene and supervise when necessary  - Involve family in performance of ADLs  - Assess for home care needs following discharge   - Consider OT consult to assist with ADL evaluation and planning for discharge  - Provide patient education as appropriate  Outcome: Progressing  Goal: Maintain proper alignment of affected body part  Description: INTERVENTIONS:  - Support, maintain and protect limb and body alignment  - Provide patient/ family with appropriate education  Outcome: Progressing     Problem: Neurological Deficit  Goal: Neurological status is stable or improving  Description: Interventions:  - Monitor and assess patient's level of consciousness, motor function, sensory function, and level of assistance needed for ADLs  - Monitor and report changes from baseline  Collaborate with interdisciplinary team to initiate plan and implement interventions as ordered  - Provide and maintain a safe environment  - Consider seizure precautions  - Consider fall precautions  - Consider aspiration precautions  - Consider bleeding precautions  Outcome: Progressing     Problem: Activity Intolerance/Impaired Mobility  Goal: Mobility/activity is maintained at optimum level for patient  Description: Interventions:  - Assess and monitor patient  barriers to mobility and need for assistive/adaptive devices  - Assess patient's emotional response to limitations  - Collaborate with interdisciplinary team and initiate plans and interventions as ordered  - Encourage independent activity per ability   - Maintain proper body alignment  - Perform active/passive rom as tolerated/ordered  - Plan activities to conserve energy   - Turn patient as appropriate  Outcome: Progressing     Problem: Communication Impairment  Goal: Ability to express needs and understand communication  Description: Assess patient's communication skills and ability to understand information    Patient will demonstrate use of effective communication techniques, alternative methods of communication and understanding even if not able to speak  - Encourage communication and provide alternate methods of communication as needed  - Collaborate with case management/ for discharge needs  - Include patient/family/caregiver in decisions related to communication  Outcome: Progressing     Problem: Potential for Aspiration  Goal: Non-ventilated patient's risk of aspiration is minimized  Description: Assess and monitor vital signs, respiratory status, and labs (WBC)  Monitor for signs of aspiration (tachypnea, cough, rales, wheezing, cyanosis, fever)  - Assess and monitor patient's ability to swallow  - Place patient up in chair to eat if possible  - HOB up at 90 degrees to eat if unable to get patient up into chair   - Supervise patient during oral intake  - Instruct patient/ family to take small bites  - Instruct patient/ family to take small single sips when taking liquids  - Follow patient-specific strategies generated by speech pathologist   Outcome: Progressing  Goal: Ventilated patient's risk of aspiration is minimized  Description: Assess and monitor vital signs, respiratory status, airway cuff pressure, and labs (WBC)  Monitor for signs of aspiration (tachypnea, cough, rales, wheezing, cyanosis, fever)  - Elevate head of bed 30 degrees if patient has tube feeding   - Monitor tube feeding  Outcome: Progressing     Problem: Nutrition  Goal: Nutrition/Hydration status is improving  Description: Monitor and assess patient's nutrition/hydration status for malnutrition (ex- brittle hair, bruises, dry skin, pale skin and conjunctiva, muscle wasting, smooth red tongue, and disorientation)  Collaborate with interdisciplinary team and initiate plan and interventions as ordered  Monitor patient's weight and dietary intake as ordered or per policy   Utilize nutrition screening tool and intervene per policy  Determine patient's food preferences and provide high-protein, high-caloric foods as appropriate  - Assist patient with eating   - Allow adequate time for meals   - Encourage patient to take dietary supplement as ordered  - Collaborate with clinical nutritionist   - Include patient/family/caregiver in decisions related to nutrition    Outcome: Progressing

## 2021-04-05 NOTE — NURSING NOTE
Phone call made to floor to give report to primary RN  RN at another patients bedside, requesting to call back  Awaiting return phone call

## 2021-04-05 NOTE — PHYSICAL THERAPY NOTE
PHYSICAL THERAPY TREATMENT NOTE  NAME:  Ravi West  DATE: 04/05/21    Length Of Stay: 4  Performed at least 2 patient identifiers during session: Name and Birthday  Treatment time: 2756-7018  Treatment length: 23 min       04/05/21 1316   Note Type   Note Type Treatment   Pain Assessment   Pain Assessment Tool 0-10   Pain Score No Pain   Restrictions/Precautions   Other Precautions Fall Risk   General   Chart Reviewed Yes   Response to Previous Treatment Patient with no complaints from previous session  Cognition   Overall Cognitive Status WFL   Orientation Level Oriented X4   Following Commands Follows all commands and directions without difficulty   Subjective   Subjective "I'll do whatever you want "   Bed Mobility   Additional Comments Pt standing at bedside at start of session without AD, pt is reported by nsg to be (I) in his room   Transfers   Sit to Stand 7  Independent   Stand to Sit 7  Independent   Stand pivot 7  Independent   Additional Comments Pt completed all transfers without AD no imbalance noted   Ambulation/Elevation   Gait pattern Wide SAULO; Short stride  (increased path deviation)   Gait Assistance 5  Supervision   Additional items Verbal cues   Assistive Device None   Distance 400' no AD wide SAULO, noted path deviation, no LOB   Stair Management Assistance 5  Supervision   Additional items Verbal cues   Stair Management Technique One rail R;Alternating pattern; Foreward   Number of Stairs 10  (4 x 6", 6 x 4" steps)   Balance   Static Sitting Normal   Dynamic Sitting Good   Static Standing Fair +   Dynamic Standing Fair   Ambulatory Fair   Endurance Deficit   Endurance Deficit No   Activity Tolerance   Activity Tolerance Patient tolerated treatment well   Nurse Made Aware Sophia MOSLEY   Exercises   Balance training  Pt completed 2 x 30' SLS with U/L UE support on bedrail and SBA for safety, tandem gait 6' x 6 reps, gait with head turns/nods x 150' with SBA - 1 instance of LOB with vertical head turns, pt able to self correct without assistance   Assessment   Prognosis Excellent   Problem List Decreased strength;Decreased endurance; Impaired balance;Decreased mobility   Barriers to Discharge None   Goals   PT Treatment Day 2   Plan   Treatment/Interventions Functional transfer training;LE strengthening/ROM; Elevations; Therapeutic exercise; Endurance training;Patient/family training;Equipment eval/education;Gait training; Compensatory technique education; Bed mobility;Spoke to nursing   Progress Progressing toward goals   PT Frequency 2-3x/wk   Recommendation   PT Discharge Recommendation Return to previous environment with social support; Other (Comment)  (OPPT)   Equipment Recommended   (none anticipated)   PT - OK to Discharge   (when medically clear)   Additional Comments pt seated at EOB at end of session all needs in reach   [facility] 8 in Bed Without Bedrails 4   Lying on Back to Sitting on Edge of Flat Bed 4   Moving Bed to Chair 4   Standing Up From Chair 4   Walk in Room 3   Climb 3-5 Stairs 3   Basic Mobility Inpatient Raw Score 22   Basic Mobility Standardized Score 47 4     The patient's AM-PAC Basic Mobility Inpatient Short Form Raw Score is 22, Standardized Score is 47 4  A standardized score greater than 42 9 suggests the patient may benefit from discharge to home  Please also refer to the recommendation of the Physical Therapist for safe discharge planning  Assessment: Pt seen for PT treatment session this date with interventions consisting of gait training w/ emphasis on improving pt's ability to ambulate level surfaces x 400' with close S provided by therapist with no AD and neuromuscular re-education: tandem forward and single limb stance with U/L UE support and gait with head turns/nods x 150' all with SBA for safety  Pt agreeable to PT treatment session upon arrival, pt found ambulating to , in no apparent distress   In comparison to previous session, pt with improvements in activity tolerance as evidenced by pt able to ambulate increased distance, pt able to participate in dynamic balance training with 1 LOB with pt able to self-correct without assistance  Pt continues to demonstrate decreased ambulatory and dynamic standing balance with path deviations  Post session: pt returned BTB, all needs in reach and RN notified of session findings/recommendations Continue to recommend OP PT at time of d/c in order to maximize pt's functional independence and safety w/ mobility  Pt continues to be functioning below baseline level, and remains limited 2* factors listed above and including decreased strength, balance, mobility, and acitivity tolerance  PT will continue to see pt while here in order to address the deficits listed above and provide interventions consistent w/ POC in effort to achieve STGs       Flori Darby, PT,DPT

## 2021-04-05 NOTE — ASSESSMENT & PLAN NOTE
· Baseline creatinine: 1 3-1 5  · Patient has not seen any provider in the last 10 years and has not been on any maintenance medications  · Kidney and bladder ultrasound: normal  · Avoid nephrotoxins  · Avoid hypotensive episodes  · Nephrology consulted for optimization of renal function prior to imaging with contrast for embolic stroke work-up, recommendafter imaging; patient not on ACEi/ARB, patient not on any diureticsations appreciated - patient currently on fluids at this time and will receive a bolus prior to CT scan then will hydrate patient again after CT scan and maintain him on IVF

## 2021-04-05 NOTE — OCCUPATIONAL THERAPY NOTE
Occupational Therapy Screen     Patient Name: Seda Watts  SJAOE'N Date: 4/5/2021  Problem List  Principal Problem:    Hypertensive urgency  Active Problems:    Acute kidney injury ([facility] [address] )    Elevated troponin level not due to acute coronary syndrome    Drug use    Tobacco use    Leukocytosis    Hyperlipidemia    Acute CVA (cerebrovascular accident) ([facility] [address] )       OT orders received  Chart review completed  PT admitted to [address] [place] [address] 4/1/2021 d/t experiencing slurred speech and gait disturbance  Dx: hypertensive urgency  Pt reports he has been taking himself to the restroom and ambulating independently in the room with no difficulty noted  Pt reports all symptoms have resolved at this time  Pt reports no further needs for OT services at this time and has no concerns for returning home  D/c OT services effective 4/5/2021  If new concerns arise, please re-consult          04/05/21 7360   Note Type   Note type Screen   Pain Assessment   Pain Assessment Tool 0-10   Pain Score No Pain       Virgen Trimble, OTR/L

## 2021-04-05 NOTE — ASSESSMENT & PLAN NOTE
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 as well as marijuana - denies recent use however may contribute to his BP  · CT head: No evidence of acute infarct or intracranial hemorrhage   Patchy areas of microvascular ischemic change with small lacunar infarcts   Asymmetric right transverse and sigmoid sinus commonly can be a normal variant  · Renal following and appreciate their input  BPs improving with recent adjustments  · Continue amlodipine 10 mg daily, hydralazine 50 mg every 8 hours    · TTE significant for left ventricular hypertrophy likely secondary to long-standing uncontrolled hypertension

## 2021-04-05 NOTE — SPEECH THERAPY NOTE
Speech/Language Pathology Progress Note    Patient Name: Lora Ibarra  MHIHI'X Date: 4/5/2021     ST consult received  Pt is currently NPO for CHRISTIANNE procedure  Hold ST orders for now, will f/u tomorrow 4/6/21

## 2021-04-05 NOTE — CASE MANAGEMENT
Aware patient needs a Zio patch  CM called [facility] and they scheduled him for Friday    This was placed on the AVS

## 2021-04-05 NOTE — ANESTHESIA POSTPROCEDURE EVALUATION
Post-Op Assessment Note    CV Status:  Stable  Pain Score: 0    Pain management: adequate     Mental Status:  Sleepy   Hydration Status:  Euvolemic   PONV Controlled:  None   Airway Patency:  Patent   Two or more mitigation strategies used for obstructive sleep apnea   Post Op Vitals Reviewed: Yes      Staff: Anesthesiologist, CRNA         No complications documented      BP  94/73   Temp   97 8   Pulse  62   Resp   16   SpO2   97

## 2021-04-06 VITALS
SYSTOLIC BLOOD PRESSURE: 162 MMHG | RESPIRATION RATE: 18 BRPM | WEIGHT: 209.5 LBS | HEIGHT: 70 IN | TEMPERATURE: 97.7 F | BODY MASS INDEX: 29.99 KG/M2 | DIASTOLIC BLOOD PRESSURE: 97 MMHG | OXYGEN SATURATION: 96 % | HEART RATE: 68 BPM

## 2021-04-06 PROBLEM — N18.2 STAGE 2 CHRONIC KIDNEY DISEASE: Status: ACTIVE | Noted: 2020-10-21

## 2021-04-06 PROBLEM — D72.829 LEUKOCYTOSIS: Status: RESOLVED | Noted: 2021-04-01 | Resolved: 2021-04-06

## 2021-04-06 PROBLEM — R79.89 ELEVATED TROPONIN LEVEL NOT DUE TO ACUTE CORONARY SYNDROME: Status: RESOLVED | Noted: 2021-04-01 | Resolved: 2021-04-06

## 2021-04-06 PROBLEM — I10 ESSENTIAL HYPERTENSION: Status: ACTIVE | Noted: 2021-04-01

## 2021-04-06 PROBLEM — R77.8 ELEVATED TROPONIN LEVEL NOT DUE TO ACUTE CORONARY SYNDROME: Status: RESOLVED | Noted: 2021-04-01 | Resolved: 2021-04-06

## 2021-04-06 LAB
DEPRECATED AT III PPP: 88 % OF NORMAL (ref 92–136)
PROT S ACT/NOR PPP: 129 % (ref 57–157)
PROT S PPP-ACNC: 94 % (ref 60–150)

## 2021-04-06 PROCEDURE — 99239 HOSP IP/OBS DSCHRG MGMT >30: CPT | Performed by: PHYSICIAN ASSISTANT

## 2021-04-06 PROCEDURE — 99232 SBSQ HOSP IP/OBS MODERATE 35: CPT | Performed by: INTERNAL MEDICINE

## 2021-04-06 RX ORDER — LOSARTAN POTASSIUM AND HYDROCHLOROTHIAZIDE 12.5; 5 MG/1; MG/1
1 TABLET ORAL DAILY
Status: DISCONTINUED | OUTPATIENT
Start: 2021-04-06 | End: 2021-04-06

## 2021-04-06 RX ORDER — AMLODIPINE BESYLATE 10 MG/1
10 TABLET ORAL DAILY
Qty: 30 TABLET | Refills: 0 | Status: SHIPPED | OUTPATIENT
Start: 2021-04-07

## 2021-04-06 RX ORDER — ATORVASTATIN CALCIUM 40 MG/1
40 TABLET, FILM COATED ORAL EVERY EVENING
Qty: 30 TABLET | Refills: 0 | Status: SHIPPED | OUTPATIENT
Start: 2021-04-06

## 2021-04-06 RX ORDER — LOSARTAN POTASSIUM 50 MG/1
50 TABLET ORAL DAILY
Status: DISCONTINUED | OUTPATIENT
Start: 2021-04-06 | End: 2021-04-06 | Stop reason: HOSPADM

## 2021-04-06 RX ORDER — ASPIRIN 81 MG/1
81 TABLET ORAL DAILY
Refills: 0 | COMMUNITY
Start: 2021-04-07

## 2021-04-06 RX ORDER — HYDROCHLOROTHIAZIDE 12.5 MG/1
12.5 TABLET ORAL DAILY
Status: DISCONTINUED | OUTPATIENT
Start: 2021-04-06 | End: 2021-04-06 | Stop reason: HOSPADM

## 2021-04-06 RX ORDER — LOSARTAN POTASSIUM AND HYDROCHLOROTHIAZIDE 12.5; 5 MG/1; MG/1
1 TABLET ORAL 2 TIMES DAILY
Qty: 60 TABLET | Refills: 0 | Status: SHIPPED | OUTPATIENT
Start: 2021-04-06

## 2021-04-06 RX ADMIN — HYDRALAZINE HYDROCHLORIDE 50 MG: 25 TABLET ORAL at 05:07

## 2021-04-06 RX ADMIN — ASPIRIN 81 MG: 81 TABLET, COATED ORAL at 08:36

## 2021-04-06 RX ADMIN — SERTRALINE HYDROCHLORIDE 50 MG: 50 TABLET ORAL at 08:36

## 2021-04-06 RX ADMIN — AMLODIPINE BESYLATE 10 MG: 10 TABLET ORAL at 08:36

## 2021-04-06 RX ADMIN — LABETALOL 20 MG/4 ML (5 MG/ML) INTRAVENOUS SYRINGE 10 MG: at 00:56

## 2021-04-06 RX ADMIN — HYDROCHLOROTHIAZIDE 12.5 MG: 12.5 TABLET ORAL at 09:41

## 2021-04-06 RX ADMIN — LOSARTAN POTASSIUM 50 MG: 50 TABLET, FILM COATED ORAL at 09:41

## 2021-04-06 RX ADMIN — HEPARIN SODIUM 5000 UNITS: 5000 INJECTION INTRAVENOUS; SUBCUTANEOUS at 05:07

## 2021-04-06 NOTE — PLAN OF CARE
Problem: Potential for Falls  Goal: Patient will remain free of falls  Description: INTERVENTIONS:  - Assess patient frequently for physical needs  -  Identify cognitive and physical deficits and behaviors that affect risk of falls    -  Bridgman fall precautions as indicated by assessment   - Educate patient/family on patient safety including physical limitations  - Instruct patient to call for assistance with activity based on assessment  - Modify environment to reduce risk of injury, call bell within reach  - Consider OT/PT consult to assist with strengthening/mobility  4/6/2021 1101 by Azalia Calix RN  Outcome: Adequate for Discharge  4/6/2021 1059 by Azalia Calix RN  Outcome: Progressing     Problem: CARDIOVASCULAR - ADULT  Goal: Maintains optimal cardiac output and hemodynamic stability  Description: INTERVENTIONS:  - Monitor I/O, vital signs and rhythm  - Monitor for S/S and trends of decreased cardiac output  - Administer and titrate ordered vasoactive medications to optimize hemodynamic stability  - Assess quality of pulses, skin color and temperature  - Assess for signs of decreased coronary artery perfusion  - Instruct patient to report change in severity of symptoms  4/6/2021 1101 by Azalia Calix RN  Outcome: Adequate for Discharge  4/6/2021 1059 by Azalia Calix RN  Outcome: Progressing  Goal: Absence of cardiac dysrhythmias or at baseline rhythm  Description: INTERVENTIONS:  - Continuous cardiac monitoring, vital signs, obtain 12 lead EKG if ordered  - Administer antiarrhythmic and heart rate control medications as ordered  - Monitor electrolytes and administer replacement therapy as ordered  4/6/2021 1101 by Azalia Calix RN  Outcome: Adequate for Discharge  4/6/2021 1059 by Azalia Calix RN  Outcome: Progressing     Problem: PAIN - ADULT  Goal: Verbalizes/displays adequate comfort level or baseline comfort level  Description: Interventions:  - Encourage patient to monitor pain and request assistance  - Assess pain using appropriate pain scale  - Administer analgesics based on type and severity of pain and evaluate response  - Implement non-pharmacological measures as appropriate and evaluate response  - Consider cultural and social influences on pain and pain management  - Notify physician/advanced practitioner if interventions unsuccessful or patient reports new pain  4/6/2021 1101 by Nadeem Read RN  Outcome: Adequate for Discharge  4/6/2021 1059 by Nadeem Read RN  Outcome: Progressing     Problem: INFECTION - ADULT  Goal: Absence or prevention of progression during hospitalization  Description: INTERVENTIONS:  - Assess and monitor for signs and symptoms of infection  - Monitor lab/diagnostic results  - Monitor all insertion sites, i e  indwelling lines, tubes, and drains  - Monitor endotracheal if appropriate and nasal secretions for changes in amount and color  - Blanca appropriate cooling/warming therapies per order  - Administer medications as ordered  - Instruct and encourage patient and family to use good hand hygiene technique  - Identify and instruct in appropriate isolation precautions for identified infection/condition  4/6/2021 1101 by Nadeem Read RN  Outcome: Adequate for Discharge  4/6/2021 1059 by Nadeem Read RN  Outcome: Progressing  Goal: Absence of fever/infection during neutropenic period  Description: INTERVENTIONS:  - Monitor WBC    4/6/2021 1101 by Nadeem Read RN  Outcome: Adequate for Discharge  4/6/2021 1059 by Nadeem Read RN  Outcome: Progressing     Problem: SAFETY ADULT  Goal: Patient will remain free of falls  Description: INTERVENTIONS:  - Assess patient frequently for physical needs  -  Identify cognitive and physical deficits and behaviors that affect risk of falls    -  Blanca fall precautions as indicated by assessment   - Educate patient/family on patient safety including physical limitations  - Instruct patient to call for assistance with activity based on assessment  - Modify environment to reduce risk of injury, call bell within reach  - Consider OT/PT consult to assist with strengthening/mobility  4/6/2021 1101 by Farzad Martin RN  Outcome: Adequate for Discharge  4/6/2021 1059 by Farzad Martin RN  Outcome: Progressing  Goal: Maintain or return to baseline ADL function  Description: INTERVENTIONS:  -  Assess patient's ability to carry out ADLs; assess patient's baseline for ADL function and identify physical deficits which impact ability to perform ADLs (bathing, care of mouth/teeth, toileting, grooming, dressing, etc )  - Assess/evaluate cause of self-care deficits   - Assess range of motion  - Assess patient's mobility; develop plan if impaired  - Assess patient's need for assistive devices and provide as appropriate  - Encourage maximum independence but intervene and supervise when necessary  - Involve family in performance of ADLs  - Assess for home care needs following discharge   - Consider OT consult to assist with ADL evaluation and planning for discharge  - Provide patient education as appropriate  4/6/2021 1101 by Farzad Martin RN  Outcome: Adequate for Discharge  4/6/2021 1059 by Farzad Martin RN  Outcome: Progressing  Goal: Maintain or return mobility status to optimal level  Description: INTERVENTIONS:  - Assess patient's baseline mobility status (ambulation, transfers, stairs, etc )    - Identify cognitive and physical deficits and behaviors that affect mobility  - Identify mobility aids required to assist with transfers and/or ambulation (gait belt, sit-to-stand, lift, walker, cane, etc )  - Terry fall precautions as indicated by assessment  - Record patient progress and toleration of activity level on Mobility SBAR; progress patient to next Phase/Stage  - Instruct patient to call for assistance with activity based on assessment  - Consider rehabilitation consult to assist with strengthening/weightbearing, etc   4/6/2021 1101 by Farzad Martin RN  Outcome: Adequate for Discharge  4/6/2021 1059 by Farzad Martin RN  Outcome: Progressing     Problem: DISCHARGE PLANNING  Goal: Discharge to home or other facility with appropriate resources  Description: INTERVENTIONS:  - Identify barriers to discharge w/patient and caregiver  - Arrange for needed discharge resources and transportation as appropriate  - Identify discharge learning needs (meds, wound care, etc )  - Arrange for interpretive services to assist at discharge as needed  - Refer to Case Management Department for coordinating discharge planning if the patient needs post-hospital services based on physician/advanced practitioner order or complex needs related to functional status, cognitive ability, or social support system  4/6/2021 1101 by Rigoberto Ortez RN  Outcome: Adequate for Discharge  4/6/2021 1059 by Rigoberto Ortez RN  Outcome: Progressing     Problem: Knowledge Deficit  Goal: Patient/family/caregiver demonstrates understanding of disease process, treatment plan, medications, and discharge instructions  Description: Complete learning assessment and assess knowledge base    Interventions:  - Provide teaching at level of understanding  - Provide teaching via preferred learning methods  4/6/2021 1101 by Rigoberto Ortez RN  Outcome: Adequate for Discharge  4/6/2021 1059 by Rigoberto Ortez RN  Outcome: Progressing     Problem: NEUROSENSORY - ADULT  Goal: Achieves stable or improved neurological status  Description: INTERVENTIONS  - Monitor and report changes in neurological status  - Monitor vital signs such as temperature, blood pressure, glucose, and any other labs ordered   - Initiate measures to prevent increased intracranial pressure  - Monitor for seizure activity and implement precautions if appropriate      4/6/2021 1101 by Rigoberto Ortez RN  Outcome: Adequate for Discharge  4/6/2021 1059 by Rigoberto Ortez RN  Outcome: Progressing  Goal: Achieves maximal functionality and self care  Description: INTERVENTIONS  - Monitor swallowing and airway patency with patient fatigue and changes in neurological status, monitor speech  - Encourage and assist patient to increase activity and self care     - Encourage visually impaired, hearing impaired and aphasic patients to use assistive/communication devices  4/6/2021 1101 by Farzad Martin RN  Outcome: Adequate for Discharge  4/6/2021 1059 by Farzad Martin RN  Outcome: Progressing     Problem: RESPIRATORY - ADULT  Goal: Achieves optimal ventilation and oxygenation  Description: INTERVENTIONS:  - Assess for changes in respiratory status  - Assess for changes in mentation and behavior  - Position to facilitate oxygenation and minimize respiratory effort  - Oxygen administered by appropriate delivery if ordered  - Initiate smoking cessation education as indicated  - Encourage broncho-pulmonary hygiene including cough, deep breathe, Incentive Spirometry  - Assess the need for suctioning and aspirate as needed  - Assess and instruct to report SOB or any respiratory difficulty  - Respiratory Therapy support as indicated  4/6/2021 1101 by Farzad Martin RN  Outcome: Adequate for Discharge  4/6/2021 1059 by Farzad Martin RN  Outcome: Progressing     Problem: GASTROINTESTINAL - ADULT  Goal: Minimal or absence of nausea and/or vomiting  Description: INTERVENTIONS:  - Administer IV fluids if ordered to ensure adequate hydration  - Maintain NPO status until nausea and vomiting are resolved  - Nasogastric tube if ordered  - Administer ordered antiemetic medications as needed  - Provide nonpharmacologic comfort measures as appropriate  - Advance diet as tolerated, if ordered  - Consider nutrition services referral to assist patient with adequate nutrition and appropriate food choices  4/6/2021 1101 by Farzad Martin RN  Outcome: Adequate for Discharge  4/6/2021 1059 by Farzad Martin RN  Outcome: Progressing  Goal: Maintains or returns to baseline bowel function  Description: INTERVENTIONS:  - Assess bowel function  - Encourage oral fluids to ensure adequate hydration  - Administer IV fluids if ordered to ensure adequate hydration  - Administer ordered medications as needed  - Encourage mobilization and activity  - Consider nutritional services referral to assist patient with adequate nutrition and appropriate food choices  4/6/2021 1101 by Angelita Prescott RN  Outcome: Adequate for Discharge  4/6/2021 1059 by Angelita Prescott RN  Outcome: Progressing  Goal: Maintains adequate nutritional intake  Description: INTERVENTIONS:  - Monitor percentage of each meal consumed  - Identify factors contributing to decreased intake, treat as appropriate  - Assist with meals as needed  - Monitor I&O, weight, and lab values if indicated  - Obtain nutrition services referral as needed  4/6/2021 1101 by Angelita Prescott RN  Outcome: Adequate for Discharge  4/6/2021 1059 by Angelita Prescott RN  Outcome: Progressing  Goal: Establish and maintain optimal ostomy function  Description: INTERVENTIONS:  - Assess bowel function  - Encourage oral fluids to ensure adequate hydration  - Administer IV fluids if ordered to ensure adequate hydration   - Administer ordered medications as needed  - Encourage mobilization and activity  - Nutrition services referral to assist patient with appropriate food choices  - Assess stoma site  - Consider wound care consult   4/6/2021 1101 by Angelita Prescott RN  Outcome: Adequate for Discharge  4/6/2021 1059 by Angelita Prescott RN  Outcome: Progressing     Problem: GENITOURINARY - ADULT  Goal: Maintains or returns to baseline urinary function  Description: INTERVENTIONS:  - Assess urinary function  - Encourage oral fluids to ensure adequate hydration if ordered  - Administer IV fluids as ordered to ensure adequate hydration  - Administer ordered medications as needed  - Offer frequent toileting  - Follow urinary retention protocol if ordered  4/6/2021 1101 by Angelita Prescott RN  Outcome: Adequate for Discharge  4/6/2021 1059 by Angelita Prescott RN  Outcome: Progressing  Goal: Absence of urinary retention  Description: INTERVENTIONS:  - Assess patients ability to void and empty bladder  - Monitor I/O  - Bladder scan as needed  - Discuss with physician/AP medications to alleviate retention as needed  - Discuss catheterization for long term situations as appropriate  4/6/2021 1101 by Maycol Rodriguez RN  Outcome: Adequate for Discharge  4/6/2021 1059 by Maycol Rodriguez RN  Outcome: Progressing  Goal: Urinary catheter remains patent  Description: INTERVENTIONS:  - Assess patency of urinary catheter  - If patient has a chronic quezada, consider changing catheter if non-functioning  - Follow guidelines for intermittent irrigation of non-functioning urinary catheter  4/6/2021 1101 by Maycol Rodriguez RN  Outcome: Adequate for Discharge  4/6/2021 1059 by Maycol Rodriguez RN  Outcome: Progressing     Problem: METABOLIC, FLUID AND ELECTROLYTES - ADULT  Goal: Electrolytes maintained within normal limits  Description: INTERVENTIONS:  - Monitor labs and assess patient for signs and symptoms of electrolyte imbalances  - Administer electrolyte replacement as ordered  - Monitor response to electrolyte replacements, including repeat lab results as appropriate  - Instruct patient on fluid and nutrition as appropriate  4/6/2021 1101 by Maycol Rodriguez RN  Outcome: Adequate for Discharge  4/6/2021 1059 by Maycol Rodriguez RN  Outcome: Progressing  Goal: Fluid balance maintained  Description: INTERVENTIONS:  - Monitor labs   - Monitor I/O and WT  - Instruct patient on fluid and nutrition as appropriate  - Assess for signs & symptoms of volume excess or deficit  4/6/2021 1101 by Maycol Rodriguez RN  Outcome: Adequate for Discharge  4/6/2021 1059 by Maycol Rodriguez RN  Outcome: Progressing  Goal: Glucose maintained within target range  Description: INTERVENTIONS:  - Monitor Blood Glucose as ordered  - Assess for signs and symptoms of hyperglycemia and hypoglycemia  - Administer ordered medications to maintain glucose within target range  - Assess nutritional intake and initiate nutrition service referral as needed  4/6/2021 1101 by Jarod Erwin DIMITRI Nicolas  Outcome: Adequate for Discharge  4/6/2021 1059 by Salud Leon RN  Outcome: Progressing     Problem: SKIN/TISSUE INTEGRITY - ADULT  Goal: Skin integrity remains intact  Description: INTERVENTIONS  - Identify patients at risk for skin breakdown  - Assess and monitor skin integrity  - Assess and monitor nutrition and hydration status  - Monitor labs (i e  albumin)  - Assess for incontinence   - Turn and reposition patient  - Assist with mobility/ambulation  - Relieve pressure over bony prominences  - Avoid friction and shearing  - Provide appropriate hygiene as needed including keeping skin clean and dry  - Evaluate need for skin moisturizer/barrier cream  - Collaborate with interdisciplinary team (i e  Nutrition, Rehabilitation, etc )   - Patient/family teaching  4/6/2021 1101 by Salud Leon RN  Outcome: Adequate for Discharge  4/6/2021 1059 by Salud Leon RN  Outcome: Progressing  Goal: Incision(s), wounds(s) or drain site(s) healing without S/S of infection  Description: INTERVENTIONS  - Assess and document risk factors for skin impairment   - Assess and document dressing, incision, wound bed, drain sites and surrounding tissue  - Consider nutrition services referral as needed  - Oral mucous membranes remain intact  - Provide patient/ family education  4/6/2021 1101 by Salud Leon RN  Outcome: Adequate for Discharge  4/6/2021 1059 by Salud Leon RN  Outcome: Progressing  Goal: Oral mucous membranes remain intact  Description: INTERVENTIONS  - Assess oral mucosa and hygiene practices  - Implement preventative oral hygiene regimen  - Implement oral medicated treatments as ordered  - Initiate Nutrition services referral as needed  4/6/2021 1101 by Salud Leon RN  Outcome: Adequate for Discharge  4/6/2021 1059 by Salud Leon RN  Outcome: Progressing     Problem: HEMATOLOGIC - ADULT  Goal: Maintains hematologic stability  Description: INTERVENTIONS  - Assess for signs and symptoms of bleeding or hemorrhage  - Monitor labs  - Administer supportive blood products/factors as ordered and appropriate  4/6/2021 1101 by Anny Roberts RN  Outcome: Adequate for Discharge  4/6/2021 1059 by Anny Roberts RN  Outcome: Progressing     Problem: MUSCULOSKELETAL - ADULT  Goal: Maintain or return mobility to safest level of function  Description: INTERVENTIONS:  - Assess patient's ability to carry out ADLs; assess patient's baseline for ADL function and identify physical deficits which impact ability to perform ADLs (bathing, care of mouth/teeth, toileting, grooming, dressing, etc )  - Assess/evaluate cause of self-care deficits   - Assess range of motion  - Assess patient's mobility  - Assess patient's need for assistive devices and provide as appropriate  - Encourage maximum independence but intervene and supervise when necessary  - Involve family in performance of ADLs  - Assess for home care needs following discharge   - Consider OT consult to assist with ADL evaluation and planning for discharge  - Provide patient education as appropriate  4/6/2021 1101 by Anny Roberts RN  Outcome: Adequate for Discharge  4/6/2021 1059 by Anny Roberts RN  Outcome: Progressing  Goal: Maintain proper alignment of affected body part  Description: INTERVENTIONS:  - Support, maintain and protect limb and body alignment  - Provide patient/ family with appropriate education  4/6/2021 1101 by Anny Roberts RN  Outcome: Adequate for Discharge  4/6/2021 1059 by Anny Roberts RN  Outcome: Progressing     Problem: Neurological Deficit  Goal: Neurological status is stable or improving  Description: Interventions:  - Monitor and assess patient's level of consciousness, motor function, sensory function, and level of assistance needed for ADLs  - Monitor and report changes from baseline  Collaborate with interdisciplinary team to initiate plan and implement interventions as ordered  - Provide and maintain a safe environment  - Consider seizure precautions  - Consider fall precautions    - Consider aspiration precautions  - Consider bleeding precautions  4/6/2021 1101 by Reji Pastrana RN  Outcome: Adequate for Discharge  4/6/2021 1059 by Reji Pastrana RN  Outcome: Progressing     Problem: Activity Intolerance/Impaired Mobility  Goal: Mobility/activity is maintained at optimum level for patient  Description: Interventions:  - Assess and monitor patient  barriers to mobility and need for assistive/adaptive devices  - Assess patient's emotional response to limitations  - Collaborate with interdisciplinary team and initiate plans and interventions as ordered  - Encourage independent activity per ability   - Maintain proper body alignment  - Perform active/passive rom as tolerated/ordered  - Plan activities to conserve energy   - Turn patient as appropriate  4/6/2021 1101 by Reji Pastrana RN  Outcome: Adequate for Discharge  4/6/2021 1059 by Reji Pastrana RN  Outcome: Progressing     Problem: Communication Impairment  Goal: Ability to express needs and understand communication  Description: Assess patient's communication skills and ability to understand information  Patient will demonstrate use of effective communication techniques, alternative methods of communication and understanding even if not able to speak  - Encourage communication and provide alternate methods of communication as needed  - Collaborate with case management/ for discharge needs  - Include patient/family/caregiver in decisions related to communication  4/6/2021 1101 by Reji Pastrana RN  Outcome: Adequate for Discharge  4/6/2021 1059 by Reji Pastrana RN  Outcome: Progressing     Problem: Potential for Aspiration  Goal: Non-ventilated patient's risk of aspiration is minimized  Description: Assess and monitor vital signs, respiratory status, and labs (WBC)  Monitor for signs of aspiration (tachypnea, cough, rales, wheezing, cyanosis, fever)  - Assess and monitor patient's ability to swallow    - Place patient up in chair to eat if possible  - HOB up at 90 degrees to eat if unable to get patient up into chair   - Supervise patient during oral intake  - Instruct patient/ family to take small bites  - Instruct patient/ family to take small single sips when taking liquids  - Follow patient-specific strategies generated by speech pathologist   4/6/2021 1101 by Verónica Milan RN  Outcome: Adequate for Discharge  4/6/2021 1059 by Verónica Milan RN  Outcome: Progressing  Goal: Ventilated patient's risk of aspiration is minimized  Description: Assess and monitor vital signs, respiratory status, airway cuff pressure, and labs (WBC)  Monitor for signs of aspiration (tachypnea, cough, rales, wheezing, cyanosis, fever)  - Elevate head of bed 30 degrees if patient has tube feeding   - Monitor tube feeding  4/6/2021 1101 by Verónica Milan RN  Outcome: Adequate for Discharge  4/6/2021 1059 by Verónica Milan RN  Outcome: Progressing     Problem: Nutrition  Goal: Nutrition/Hydration status is improving  Description: Monitor and assess patient's nutrition/hydration status for malnutrition (ex- brittle hair, bruises, dry skin, pale skin and conjunctiva, muscle wasting, smooth red tongue, and disorientation)  Collaborate with interdisciplinary team and initiate plan and interventions as ordered  Monitor patient's weight and dietary intake as ordered or per policy  Utilize nutrition screening tool and intervene per policy  Determine patient's food preferences and provide high-protein, high-caloric foods as appropriate  - Assist patient with eating   - Allow adequate time for meals   - Encourage patient to take dietary supplement as ordered  - Collaborate with clinical nutritionist   - Include patient/family/caregiver in decisions related to nutrition    4/6/2021 1101 by Verónica Milan RN  Outcome: Adequate for Discharge  4/6/2021 1059 by Verónica Milan RN  Outcome: Progressing     Problem: Nutrition/Hydration-ADULT  Goal: Nutrient/Hydration intake appropriate for improving, restoring or maintaining nutritional needs  Description: Monitor and assess patient's nutrition/hydration status for malnutrition  Collaborate with interdisciplinary team and initiate plan and interventions as ordered  Monitor patient's weight and dietary intake as ordered or per policy  Utilize nutrition screening tool and intervene as necessary  Determine patient's food preferences and provide high-protein, high-caloric foods as appropriate       INTERVENTIONS:  - Monitor oral intake, urinary output, labs, and treatment plans  - Assess nutrition and hydration status and recommend course of action  - Evaluate amount of meals eaten  - Assist patient with eating if necessary   - Allow adequate time for meals  - Recommend/ encourage appropriate diets, oral nutritional supplements, and vitamin/mineral supplements  - Order, calculate, and assess calorie counts as needed  - Recommend, monitor, and adjust tube feedings and TPN/PPN based on assessed needs  - Assess need for intravenous fluids  - Provide specific nutrition/hydration education as appropriate  - Include patient/family/caregiver in decisions related to nutrition  4/6/2021 1101 by Virgilio Mcwilliams RN  Outcome: Adequate for Discharge  4/6/2021 1059 by Virgilio Mcwilliams RN  Outcome: Progressing

## 2021-04-06 NOTE — DISCHARGE INSTR - AVS FIRST PAGE
Please get tested for sleep apnea  This can increase your risk of strokes and poorly controlled high blood pressure as well as excessive daytime sleepiness

## 2021-04-06 NOTE — CASE MANAGEMENT
Cm [facility] call [facility] [place] to inquire of cost of Rx  As per pharmacist, cost of meds will be $[phone] Cm discussed medication cost with the pt, pt sts he can afford the above  CM also offering pt OP Mental Health Resources, pt accepting of information

## 2021-04-06 NOTE — PLAN OF CARE
Problem: Potential for Falls  Goal: Patient will remain free of falls  Description: INTERVENTIONS:  - Assess patient frequently for physical needs  -  Identify cognitive and physical deficits and behaviors that affect risk of falls    -  [place] fall precautions as indicated by assessment   - Educate patient/family on patient safety including physical limitations  - Instruct patient to call for assistance with activity based on assessment  - Modify environment to reduce risk of injury, call bell within reach  - Consider OT/PT consult to assist with strengthening/mobility  Outcome: Progressing     Problem: CARDIOVASCULAR - ADULT  Goal: Maintains optimal cardiac output and hemodynamic stability  Description: INTERVENTIONS:  - Monitor I/O, vital signs and rhythm  - Monitor for S/S and trends of decreased cardiac output  - Administer and titrate ordered vasoactive medications to optimize hemodynamic stability  - Assess quality of pulses, skin color and temperature  - Assess for signs of decreased coronary artery perfusion  - Instruct patient to report change in severity of symptoms  Outcome: Progressing  Goal: Absence of cardiac dysrhythmias or at baseline rhythm  Description: INTERVENTIONS:  - Continuous cardiac monitoring, vital signs, obtain 12 lead EKG if ordered  - Administer antiarrhythmic and heart rate control medications as ordered  - Monitor electrolytes and administer replacement therapy as ordered  Outcome: Progressing     Problem: PAIN - ADULT  Goal: Verbalizes/displays adequate comfort level or baseline comfort level  Description: Interventions:  - Encourage patient to monitor pain and request assistance  - Assess pain using appropriate pain scale  - Administer analgesics based on type and severity of pain and evaluate response  - Implement non-pharmacological measures as appropriate and evaluate response  - Consider cultural and social influences on pain and pain management  - Notify physician/advanced practitioner if interventions unsuccessful or patient reports new pain  Outcome: Progressing     Problem: INFECTION - ADULT  Goal: Absence or prevention of progression during hospitalization  Description: INTERVENTIONS:  - Assess and monitor for signs and symptoms of infection  - Monitor lab/diagnostic results  - Monitor all insertion sites, i e  indwelling lines, tubes, and drains  - Monitor endotracheal if appropriate and nasal secretions for changes in amount and color  - [place] appropriate cooling/warming therapies per order  - Administer medications as ordered  - Instruct and encourage patient and family to use good hand hygiene technique  - Identify and instruct in appropriate isolation precautions for identified infection/condition  Outcome: Progressing  Goal: Absence of fever/infection during neutropenic period  Description: INTERVENTIONS:  - Monitor WBC    Outcome: Progressing     Problem: SAFETY ADULT  Goal: Patient will remain free of falls  Description: INTERVENTIONS:  - Assess patient frequently for physical needs  -  Identify cognitive and physical deficits and behaviors that affect risk of falls    -  [place] fall precautions as indicated by assessment   - Educate patient/family on patient safety including physical limitations  - Instruct patient to call for assistance with activity based on assessment  - Modify environment to reduce risk of injury, call bell within reach  - Consider OT/PT consult to assist with strengthening/mobility  Outcome: Progressing  Goal: Maintain or return to baseline ADL function  Description: INTERVENTIONS:  -  Assess patient's ability to carry out ADLs; assess patient's baseline for ADL function and identify physical deficits which impact ability to perform ADLs (bathing, care of mouth/teeth, toileting, grooming, dressing, etc )  - Assess/evaluate cause of self-care deficits   - Assess range of motion  - Assess patient's mobility; develop plan if impaired  - Assess patient's need for assistive devices and provide as appropriate  - Encourage maximum independence but intervene and supervise when necessary  - Involve family in performance of ADLs  - Assess for home care needs following discharge   - Consider OT consult to assist with ADL evaluation and planning for discharge  - Provide patient education as appropriate  Outcome: Progressing  Goal: Maintain or return mobility status to optimal level  Description: INTERVENTIONS:  - Assess patient's baseline mobility status (ambulation, transfers, stairs, etc )    - Identify cognitive and physical deficits and behaviors that affect mobility  - Identify mobility aids required to assist with transfers and/or ambulation (gait belt, sit-to-stand, lift, walker, cane, etc )  - [place] fall precautions as indicated by assessment  - Record patient progress and toleration of activity level on Mobility SBAR; progress patient to next Phase/Stage  - Instruct patient to call for assistance with activity based on assessment  - Consider rehabilitation consult to assist with strengthening/weightbearing, etc   Outcome: Progressing     Problem: DISCHARGE PLANNING  Goal: Discharge to home or other facility with appropriate resources  Description: INTERVENTIONS:  - Identify barriers to discharge w/patient and caregiver  - Arrange for needed discharge resources and transportation as appropriate  - Identify discharge learning needs (meds, wound care, etc )  - Arrange for interpretive services to assist at discharge as needed  - Refer to Case Management Department for coordinating discharge planning if the patient needs post-hospital services based on physician/advanced practitioner order or complex needs related to functional status, cognitive ability, or social support system  Outcome: Progressing     Problem: Knowledge Deficit  Goal: Patient/family/caregiver demonstrates understanding of disease process, treatment plan, medications, and discharge instructions  Description: Complete learning assessment and assess knowledge base  Interventions:  - Provide teaching at level of understanding  - Provide teaching via preferred learning methods  Outcome: Progressing     Problem: NEUROSENSORY - ADULT  Goal: Achieves stable or improved neurological status  Description: INTERVENTIONS  - Monitor and report changes in neurological status  - Monitor vital signs such as temperature, blood pressure, glucose, and any other labs ordered   - Initiate measures to prevent increased intracranial pressure  - Monitor for seizure activity and implement precautions if appropriate      Outcome: Progressing  Goal: Achieves maximal functionality and self care  Description: INTERVENTIONS  - Monitor swallowing and airway patency with patient fatigue and changes in neurological status, monitor speech  - Encourage and assist patient to increase activity and self care     - Encourage visually impaired, hearing impaired and aphasic patients to use assistive/communication devices  Outcome: Progressing     Problem: RESPIRATORY - ADULT  Goal: Achieves optimal ventilation and oxygenation  Description: INTERVENTIONS:  - Assess for changes in respiratory status  - Assess for changes in mentation and behavior  - Position to facilitate oxygenation and minimize respiratory effort  - Oxygen administered by appropriate delivery if ordered  - Initiate smoking cessation education as indicated  - Encourage broncho-pulmonary hygiene including cough, deep breathe, Incentive Spirometry  - Assess the need for suctioning and aspirate as needed  - Assess and instruct to report SOB or any respiratory difficulty  - Respiratory Therapy support as indicated  Outcome: Progressing     Problem: GASTROINTESTINAL - ADULT  Goal: Minimal or absence of nausea and/or vomiting  Description: INTERVENTIONS:  - Administer IV fluids if ordered to ensure adequate hydration  - Maintain NPO status until nausea and vomiting are resolved  - Nasogastric tube if ordered  - Administer ordered antiemetic medications as needed  - Provide nonpharmacologic comfort measures as appropriate  - Advance diet as tolerated, if ordered  - Consider nutrition services referral to assist patient with adequate nutrition and appropriate food choices  Outcome: Progressing  Goal: Maintains or returns to baseline bowel function  Description: INTERVENTIONS:  - Assess bowel function  - Encourage oral fluids to ensure adequate hydration  - Administer IV fluids if ordered to ensure adequate hydration  - Administer ordered medications as needed  - Encourage mobilization and activity  - Consider nutritional services referral to assist patient with adequate nutrition and appropriate food choices  Outcome: Progressing  Goal: Maintains adequate nutritional intake  Description: INTERVENTIONS:  - Monitor percentage of each meal consumed  - Identify factors contributing to decreased intake, treat as appropriate  - Assist with meals as needed  - Monitor I&O, weight, and lab values if indicated  - Obtain nutrition services referral as needed  Outcome: Progressing  Goal: Establish and maintain optimal ostomy function  Description: INTERVENTIONS:  - Assess bowel function  - Encourage oral fluids to ensure adequate hydration  - Administer IV fluids if ordered to ensure adequate hydration   - Administer ordered medications as needed  - Encourage mobilization and activity  - Nutrition services referral to assist patient with appropriate food choices  - Assess stoma site  - Consider wound care consult   Outcome: Progressing     Problem: GENITOURINARY - ADULT  Goal: Maintains or returns to baseline urinary function  Description: INTERVENTIONS:  - Assess urinary function  - Encourage oral fluids to ensure adequate hydration if ordered  - Administer IV fluids as ordered to ensure adequate hydration  - Administer ordered medications as needed  - Offer frequent toileting  - Follow urinary retention protocol if ordered  Outcome: Progressing  Goal: Absence of urinary retention  Description: INTERVENTIONS:  - Assess patients ability to void and empty bladder  - Monitor I/O  - Bladder scan as needed  - Discuss with physician/AP medications to alleviate retention as needed  - Discuss catheterization for long term situations as appropriate  Outcome: Progressing  Goal: Urinary catheter remains patent  Description: INTERVENTIONS:  - Assess patency of urinary catheter  - If patient has a chronic quezada, consider changing catheter if non-functioning  - Follow guidelines for intermittent irrigation of non-functioning urinary catheter  Outcome: Progressing     Problem: METABOLIC, FLUID AND ELECTROLYTES - ADULT  Goal: Electrolytes maintained within normal limits  Description: INTERVENTIONS:  - Monitor labs and assess patient for signs and symptoms of electrolyte imbalances  - Administer electrolyte replacement as ordered  - Monitor response to electrolyte replacements, including repeat lab results as appropriate  - Instruct patient on fluid and nutrition as appropriate  Outcome: Progressing  Goal: Fluid balance maintained  Description: INTERVENTIONS:  - Monitor labs   - Monitor I/O and WT  - Instruct patient on fluid and nutrition as appropriate  - Assess for signs & symptoms of volume excess or deficit  Outcome: Progressing  Goal: Glucose maintained within target range  Description: INTERVENTIONS:  - Monitor Blood Glucose as ordered  - Assess for signs and symptoms of hyperglycemia and hypoglycemia  - Administer ordered medications to maintain glucose within target range  - Assess nutritional intake and initiate nutrition service referral as needed  Outcome: Progressing     Problem: SKIN/TISSUE INTEGRITY - ADULT  Goal: Skin integrity remains intact  Description: INTERVENTIONS  - Identify patients at risk for skin breakdown  - Assess and monitor skin integrity  - Assess and monitor nutrition and hydration status  - Monitor labs (i e  albumin)  - Assess for incontinence   - Turn and reposition patient  - Assist with mobility/ambulation  - Relieve pressure over bony prominences  - Avoid friction and shearing  - Provide appropriate hygiene as needed including keeping skin clean and dry  - Evaluate need for skin moisturizer/barrier cream  - Collaborate with interdisciplinary team (i e  Nutrition, Rehabilitation, etc )   - Patient/family teaching  Outcome: Progressing  Goal: Incision(s), wounds(s) or drain site(s) healing without S/S of infection  Description: INTERVENTIONS  - Assess and document risk factors for skin impairment   - Assess and document dressing, incision, wound bed, drain sites and surrounding tissue  - Consider nutrition services referral as needed  - Oral mucous membranes remain intact  - Provide patient/ family education  Outcome: Progressing  Goal: Oral mucous membranes remain intact  Description: INTERVENTIONS  - Assess oral mucosa and hygiene practices  - Implement preventative oral hygiene regimen  - Implement oral medicated treatments as ordered  - Initiate Nutrition services referral as needed  Outcome: Progressing     Problem: HEMATOLOGIC - ADULT  Goal: Maintains hematologic stability  Description: INTERVENTIONS  - Assess for signs and symptoms of bleeding or hemorrhage  - Monitor labs  - Administer supportive blood products/factors as ordered and appropriate  Outcome: Progressing     Problem: MUSCULOSKELETAL - ADULT  Goal: Maintain or return mobility to safest level of function  Description: INTERVENTIONS:  - Assess patient's ability to carry out ADLs; assess patient's baseline for ADL function and identify physical deficits which impact ability to perform ADLs (bathing, care of mouth/teeth, toileting, grooming, dressing, etc )  - Assess/evaluate cause of self-care deficits   - Assess range of motion  - Assess patient's mobility  - Assess patient's need for assistive devices and provide as appropriate  - Encourage maximum independence but intervene and supervise when necessary  - Involve family in performance of ADLs  - Assess for home care needs following discharge   - Consider OT consult to assist with ADL evaluation and planning for discharge  - Provide patient education as appropriate  Outcome: Progressing  Goal: Maintain proper alignment of affected body part  Description: INTERVENTIONS:  - Support, maintain and protect limb and body alignment  - Provide patient/ family with appropriate education  Outcome: Progressing     Problem: Neurological Deficit  Goal: Neurological status is stable or improving  Description: Interventions:  - Monitor and assess patient's level of consciousness, motor function, sensory function, and level of assistance needed for ADLs  - Monitor and report changes from baseline  Collaborate with interdisciplinary team to initiate plan and implement interventions as ordered  - Provide and maintain a safe environment  - Consider seizure precautions  - Consider fall precautions  - Consider aspiration precautions  - Consider bleeding precautions  Outcome: Progressing     Problem: Activity Intolerance/Impaired Mobility  Goal: Mobility/activity is maintained at optimum level for patient  Description: Interventions:  - Assess and monitor patient  barriers to mobility and need for assistive/adaptive devices  - Assess patient's emotional response to limitations  - Collaborate with interdisciplinary team and initiate plans and interventions as ordered  - Encourage independent activity per ability   - Maintain proper body alignment  - Perform active/passive rom as tolerated/ordered  - Plan activities to conserve energy   - Turn patient as appropriate  Outcome: Progressing     Problem: Communication Impairment  Goal: Ability to express needs and understand communication  Description: Assess patient's communication skills and ability to understand information    Patient will demonstrate use of effective communication techniques, alternative methods of communication and understanding even if not able to speak  - Encourage communication and provide alternate methods of communication as needed  - Collaborate with case management/ for discharge needs  - Include patient/family/caregiver in decisions related to communication  Outcome: Progressing     Problem: Potential for Aspiration  Goal: Non-ventilated patient's risk of aspiration is minimized  Description: Assess and monitor vital signs, respiratory status, and labs (WBC)  Monitor for signs of aspiration (tachypnea, cough, rales, wheezing, cyanosis, fever)  - Assess and monitor patient's ability to swallow  - Place patient up in chair to eat if possible  - HOB up at 90 degrees to eat if unable to get patient up into chair   - Supervise patient during oral intake  - Instruct patient/ family to take small bites  - Instruct patient/ family to take small single sips when taking liquids  - Follow patient-specific strategies generated by speech pathologist   Outcome: Progressing  Goal: Ventilated patient's risk of aspiration is minimized  Description: Assess and monitor vital signs, respiratory status, airway cuff pressure, and labs (WBC)  Monitor for signs of aspiration (tachypnea, cough, rales, wheezing, cyanosis, fever)  - Elevate head of bed 30 degrees if patient has tube feeding   - Monitor tube feeding  Outcome: Progressing     Problem: Nutrition  Goal: Nutrition/Hydration status is improving  Description: Monitor and assess patient's nutrition/hydration status for malnutrition (ex- brittle hair, bruises, dry skin, pale skin and conjunctiva, muscle wasting, smooth red tongue, and disorientation)  Collaborate with interdisciplinary team and initiate plan and interventions as ordered  Monitor patient's weight and dietary intake as ordered or per policy   Utilize nutrition screening tool and intervene per policy  Determine patient's food preferences and provide high-protein, high-caloric foods as appropriate  - Assist patient with eating   - Allow adequate time for meals   - Encourage patient to take dietary supplement as ordered  - Collaborate with clinical nutritionist   - Include patient/family/caregiver in decisions related to nutrition  Outcome: Progressing     Problem: Nutrition/Hydration-ADULT  Goal: Nutrient/Hydration intake appropriate for improving, restoring or maintaining nutritional needs  Description: Monitor and assess patient's nutrition/hydration status for malnutrition  Collaborate with interdisciplinary team and initiate plan and interventions as ordered  Monitor patient's weight and dietary intake as ordered or per policy  Utilize nutrition screening tool and intervene as necessary  Determine patient's food preferences and provide high-protein, high-caloric foods as appropriate       INTERVENTIONS:  - Monitor oral intake, urinary output, labs, and treatment plans  - Assess nutrition and hydration status and recommend course of action  - Evaluate amount of meals eaten  - Assist patient with eating if necessary   - Allow adequate time for meals  - Recommend/ encourage appropriate diets, oral nutritional supplements, and vitamin/mineral supplements  - Order, calculate, and assess calorie counts as needed  - Recommend, monitor, and adjust tube feedings and TPN/PPN based on assessed needs  - Assess need for intravenous fluids  - Provide specific nutrition/hydration education as appropriate  - Include patient/family/caregiver in decisions related to nutrition  Outcome: Progressing

## 2021-04-06 NOTE — NURSING NOTE
Patient to be discharged home today; IV x2 removed intact, no bleeding noted; AVS printed and reviewed with patient, understanding verbalized; belongings reviewed and recorded; patient transported to exit via wheelchair accompanied by PCA

## 2021-04-06 NOTE — ASSESSMENT & PLAN NOTE
· Started Zoloft 50 mg daily, providing Rx for management at home with follow up with new PCP arranged by SHAQUILLE

## 2021-04-06 NOTE — ASSESSMENT & PLAN NOTE
· Baseline creatinine: 1 3-1 5  · Patient has not seen any provider in the last 10 years and has not been on any maintenance medications  · Kidney and bladder ultrasound: normal  · Avoid nephrotoxins  · Avoid hypotensive episodes  · Nephrology consulted for optimization of renal function prior to imaging with contrast for embolic stroke work-up; patient not on ACEi/ARB, patient not on any diuretics - patient currently on fluids at this time and will receive a bolus prior to CT scan then will hydrate patient again after CT scan and maintain him on IVF  · Nephrology recommends starting Losartan 50 mg & HCTZ 12 5 mg daily for hypertension management

## 2021-04-06 NOTE — PROGRESS NOTES
Progress Note - Nephrology   Yaz Mac 45 y o  male MRN: [id number]  Unit/Bed#: -08 Encounter: [id number]    A/P:  1  Acute kidney injury   Kidney function suppression previous baseline, will start the patient a medication regimen which is easier to abide by in the outpatient setting  Will transition hydralazine 3 times a day to losartan combination with hydrochlorothiazide, 50/12 5 mg for now  This can be further adjusted in the outpatient setting  2  Hypertension   As mentioned above will transition patient off of hydralazine 50 mg 3 times a day to losartan/hydrochlorothiazide 50/12 5 mg twice daily  This should continue to be modified adjust the outpatient setting as indicated  In addition, patient also likely benefit from further evaluation hypertensive standpoint occluding renal artery Dopplers as well as aldosterone/renin levels  At presentation, the patient had an elevated serum bicarbonate associated with borderline hypokalemia  This may be affect of the patient's initial presentation versus something that is more chronic, either way patient will benefit having this evaluated as an outpatient  3  Acute cerebrovascular accident   Carotid Dopplers look okay, further workup as indicated by hospitalist   4  Hypertensive urgency   Significantly improved, continue monitor and adjust medications as indicated above         Follow up reason for today's visit:  Acute kidney injury/hypertension    Hypertensive urgency    Patient Active Problem List   Diagnosis    Priapism, unspecified    Hypertension    Acute kidney injury superimposed on chronic kidney disease ([facility] )    Hypertensive urgency    Elevated troponin level not due to acute coronary syndrome    Drug use    Tobacco use    Leukocytosis    Hyperlipidemia    [facility]cerebro[facility] accident) [facility]    Current moderate episode of major depressive disorder without prior episode ([facility] )         Subjective:   No Acute events overnight    Objective:     Vitals: Blood pressure (!) 171/96, pulse 58, temperature 97 8 °F (36 6 °C), temperature source Oral, resp  rate 18, height 5' 10" (1 778 m), weight 95 kg (209 lb 8 oz), SpO2 96 %  ,Body mass index is 30 06 kg/m²  Weight (last 2 days)     Date/Time   Weight    04/06/21 0600   95 (209 5)    04/05/21 1004   95 7 (211)    04/05/21 0539   95 9 (211 4)    04/04/21 0600   96 5 (212 74)    04/04/21 0400   96 5 (212 8)                Intake/Output Summary (Last 24 hours) at 4/6/2021 0831  Last data filed at 4/5/2021 2301  Gross per 24 hour   Intake 1020 ml   Output 0 ml   Net 1020 ml     I/O last 3 completed shifts: In: 1320 [P O :1020; I V :300]  Out: 700 [Urine:700]         Physical Exam: BP (!) 171/96 (BP Location: Right arm)   Pulse 58   Temp 97 8 °F (36 6 °C) (Oral)   Resp 18   Ht 5' 10" (1 778 m)   Wt 95 kg (209 lb 8 oz)   SpO2 96%   BMI 30 06 kg/m²     General Appearance:    Alert, cooperative, no distress, appears stated age   Head:    Normocephalic, without obvious abnormality, atraumatic   Eyes:    Conjunctiva/corneas clear   Ears:    Normal external ears   Nose:   Nares normal, septum midline, mucosa normal, no drainage    or sinus tenderness   Throat:   Lips, mucosa, and tongue normal; teeth and gums normal   Neck:   Supple   Back:     Symmetric, no curvature, ROM normal, no CVA tenderness   Lungs:     Clear to auscultation bilaterally, respirations unlabored   Chest wall:    No tenderness or deformity   Heart:    Regular rate and rhythm, S1 and S2 normal, no murmur, rub   or gallop   Abdomen:     Soft, non-tender, bowel sounds active   Extremities:   Extremities normal, atraumatic, no cyanosis or edema   Skin:   Skin color, texture, turgor normal, no rashes or lesions   Lymph nodes:   Cervical normal   Neurologic:   CNII-XII intact            Lab, Imaging and other studies: I have personally reviewed pertinent labs    CBC: No results found for: WBC, HGB, HCT, MCV, PLT, ADJUSTEDWBC, MCH, MCHC, RDW, MPV, NRBC  CMP: No results found for: NA, K, CL, CO2, ANIONGAP, BUN, CREATININE, GLUCOSE, CALCIUM, AST, ALT, ALKPHOS, PROT, BILITOT, EGFR      Results from last 7 days   Lab Units 04/05/21  0434 04/04/21  0436 04/03/21  0551 04/02/21  0603 04/01/21  1231   POTASSIUM mmol/L 3 9 4 0 3 7 3 6 3 6   CHLORIDE mmol/L 104 103 104 102 99*   CO2 mmol/L 26 27 28 29 33*   BUN mg/dL 15 14 16 17 17   CREATININE mg/dL 1 27 1 35* 1 42* 1 38* 1 53*   CALCIUM mg/dL 8 9 8 6 8 7 8 7 9 3   ALK PHOS U/L  --   --   --  65 84   ALT U/L  --   --   --  33 32   AST U/L  --   --   --  19 18         Phosphorus: No results found for: PHOS  Magnesium: No results found for: MG  Urinalysis: No results found for: COLORU, CLARITYU, SPECGRAV, PHUR, LEUKOCYTESUR, NITRITE, PROTEINUA, GLUCOSEU, KETONESU, BILIRUBINUR, BLOODU  Ionized Calcium: No results found for: CAION  Coagulation: No results found for: PT, INR, APTT  Troponin: No results found for: TROPONINI  ABG: No results found for: PHART, ZZR5QYX, PO2ART, YAA6ZTF, A4EWAIKG, BEART, SOURCE  Radiology review:     IMAGING  Procedure: Ct Chest Abdomen Pelvis W Contrast    Result Date: 4/4/2021  Narrative: CT CHEST, ABDOMEN AND PELVIS WITH IV CONTRAST INDICATION:   Colon cancer, metastatic, initial workup r/o malignancy, embolic stroke work-up  COMPARISON:  Renal ultrasound 4/2/2021 TECHNIQUE: CT examination of the chest, abdomen and pelvis was performed  Axial, sagittal, and coronal 2D reformatted images were created from the source data and submitted for interpretation  Radiation dose length product (DLP) for this visit:  886 mGy-cm   This examination, like all CT scans performed in the [facility], was performed utilizing techniques to minimize radiation dose exposure, including the use of iterative reconstruction and automated exposure control  IV Contrast:  100 mL of iohexol (OMNIPAQUE)   350 Enteric Contrast: Enteric contrast was not administered  FINDINGS: CHEST LUNGS: 2 pulmonary nodules will be measured on series 3: Image 79, left lower lobe, 3 mm  Image 82, right lower lobe, 2 mm  Few scattered punctate subpleural discoid scars  Punctate intrapulmonary lymph node posterior left upper lobe along the major fissure  No infiltrate  Mild pulmonary emphysema  Central airways are clear  PLEURA:  Unremarkable  HEART/GREAT VESSELS:  Heart is not enlarged  No pericardial effusion  Aortic and coronary artery calcification  MEDIASTINUM AND ANGELICA:  Unremarkable  CHEST WALL AND LOWER NECK:   Minimal bilateral gynecomastia  Incidentally noted right subscapularis fatty muscle atrophy may be sequela of prior subscapularis tendon tear or subscapularis neuropathy  ABDOMEN LIVER/BILIARY TREE:  Unremarkable  GALLBLADDER:  No calcified gallstones  No pericholecystic inflammatory change  SPLEEN:  Unremarkable  PANCREAS:  Unremarkable  ADRENAL GLANDS:  Unremarkable  KIDNEYS/URETERS:  Unremarkable  No hydronephrosis  STOMACH AND BOWEL:  Unremarkable  APPENDIX:  A normal appendix was visualized  ABDOMINOPELVIC CAVITY:  No ascites  No pneumoperitoneum  No lymphadenopathy  VESSELS:  Aortoiliac calcification  No aneurysm  PELVIS REPRODUCTIVE ORGANS:  Calcification bilateral vas deferens  Otherwise unremarkable for patient's age  URINARY BLADDER:  Unremarkable  ABDOMINAL WALL/INGUINAL REGIONS:  Small ventral midline fat-containing hernia immediately above the umbilicus  Right lateral lower quadrant subcutaneous streaky density attributed to sequela of recent injection  OSSEOUS STRUCTURES:  No acute fracture or osseous destructive lesion identified  Mild degenerative changes of the spine  8 mm right iliac bone island  Impression: CT chest: No dominant thoracic mass or adenopathy  Few punctate pulmonary nodules with unknown long-term stability  Given provided history, advise noncontrast chest CT follow-up in 3  No acute thoracic process   CT abdomen and pelvis: No evidence of abnormal abdominopelvic mass or adenopathy  8 mm right iliac probable bone island  Given provided history and lack of comparison studies documenting long-term stability, suggest correlation with PSA level and noncontrast CT pelvis follow-up in 3 months  No acute abdominopelvic process  This study demonstrates a finding requiring imaging follow-up and was documented as such in Epic  Workstation performed: [id number]     Procedure: Vas Carotid Complete Study    Result Date: 4/5/2021  Narrative:  [facility] REPORT [facility]INICAL: Indications: Patient presents with to evaluate for carotid artery stenosis s/p recent TIA/CVA  Patient admits to episodes of slurred speech and unsteady gait  Operative History: Denies cardiovascular intervention Risk Factors The patient has history of HTN, HLD and smoking (current) 0 5 ppd  FINDINGS:  Right        Impression  PSV  EDV (cm/s)  Direction of Flow  Ratio  Dist  ICA                 64          16                      0 70  Mid  ICA                  75          18                      0 82  Prox  ICA    1 - 49%      93          16                      1 02  Dist CCA                 106          16                            Mid CCA                   91          14                      0 82  Prox CCA                 111          18                      0 55  Ext Carotid              111          11                      1 22  Prox Vert                 69          15  Antegrade                 Subclavian               140           0                            Innominate               203           0                             Left         Impression  PSV  EDV (cm/s)  Direction of Flow  Ratio  Dist  ICA                 78          22                      0 66  Mid  ICA                  82          20                      0 69  Prox   ICA    1 - 49%      90          16                      0 76  Dist CCA                  84          18                            Mid CCA 118          24                      1 23  Prox CCA                  96          20                            Ext Carotid              113          13                      0 96  Prox Vert                 72          15  Antegrade                 Subclavian               155           3                               CONCLUSION: Impression RIGHT: There is <50% stenosis noted in the internal carotid artery  Plaque is heterogenous and smooth  Vertebral artery flow is antegrade  There is no significant subclavian artery disease  LEFT: There is <50% stenosis noted in the internal carotid artery  Plaque is heterogenous and smooth  Vertebral artery flow is antegrade  There is no significant subclavian artery disease  There are no prior studies for comparison  Internal carotid artery stenosis determination by consensus criteria from: Elizabeth Perez et al  Carotid Artery Stenosis: Gray-Scale and Doppler US Diagnosis - Society of Radiologists in [facility] [address], Radiology 2003; [id number]  SIGNATURE: Electronically Signed by: Makayla Mojica on 2021-04-05 04:26:26 PM    Procedure: Vas Lower Limb Venous Duplex Study, Complete Bilateral    Result Date: 4/5/2021  Narrative:  [facility] REPORT CLINICAL: Indications: Patient presents with bilateral lower extremity edema  Operative History: Denies cardiovascular intervention Risk Factors The patient has history of HTN, HLD and smoking (current) 0 5 ppd  FINDINGS:  Segment  Right            Left              Impression       Impression       CFV      Normal (Patent)  Normal (Patent)     CONCLUSION:  Impression: RIGHT LOWER LIMB: No evidence of acute or chronic deep vein thrombosis  No evidence of superficial thrombophlebitis noted  Doppler evaluation shows a normal response to augmentation maneuvers  Popliteal, posterior tibial and anterior tibial arterial Doppler waveforms are triphasic    LEFT LOWER LIMB: No evidence of acute or chronic deep vein thrombosis  No evidence of superficial thrombophlebitis noted  Doppler evaluation shows a normal response to augmentation maneuvers  Popliteal, posterior tibial and anterior tibial arterial Doppler waveforms are triphasic  SIGNATURE: Electronically Signed by: Janelle Hoyos on 2021-04-05 01:05:13 PM      Current Facility-Administered Medications   Medication Dose Route Frequency    amLODIPine (NORVASC) tablet 10 mg  10 mg Oral Daily    aspirin (ECOTRIN LOW STRENGTH) EC tablet 81 mg  81 mg Oral Daily    atorvastatin (LIPITOR) tablet 40 mg  40 mg Oral QPM    heparin (porcine) subcutaneous injection 5,000 Units  5,000 Units Subcutaneous Q8H [facility]    hydrALAZINE (APRESOLINE) tablet 50 mg  50 mg Oral Q8H [facility]    Labetalol HCl (NORMODYNE) injection 10 mg  10 mg Intravenous Q6H PRN    ondansetron (ZOFRAN) injection 4 mg  4 mg Intravenous Once PRN    sertraline (ZOLOFT) tablet 50 mg  50 mg Oral Daily     Medications Discontinued During This Encounter   Medication Reason    labetalol (NORMODYNE) tablet 200 mg     atorvastatin (LIPITOR) tablet 20 mg     amLODIPine (NORVASC) tablet 10 mg     aspirin chewable tablet 81 mg Duplicate order    labetalol (NORMODYNE) tablet 200 mg     Labetalol HCl (NORMODYNE) injection 10 mg     hydrALAZINE (APRESOLINE) tablet 25 mg     sodium chloride 0 9 % infusion     fentaNYL (SUBLIMAZE) injection 25 mcg        Chris Dowling, DO      This progress note was produced in part using a dictation device which may document imprecise wording from author's original intent

## 2021-04-06 NOTE — ASSESSMENT & PLAN NOTE
· Patient presenting to the ED with 3-week history of slurred speech and gait disturbance  · On presentation, patient with hypertensive urgency as well as SRIRAM  · CT head negative for acute inchemia however showing multiple lacunar infarcts  · MRI brain: a few small foci of diffusion restriction bilateral cerebral hemispheres in 2 separate vascular territories, worrisome for acute embolic infarctions  · ECHO: EF 65% with grade 1 diastolic dysfunction  · CHRISTIANNE: No evidence of PFO or valvular vegetations  · Stroke pathway initiated  · Patient was already started on statin for elevated ascvd risk however increased dose to 40  · Started patient on aspirin  · Neurology consulted and appreciate their recommendations  · Carotid doppler reveals <50% stenosis in Right & Left internal carotid arteries  No significant vertebral or subclavian artery disease  · Patient will need placement of loop recorder/Zio patch to evaluate for underlying atrial fibrillation  CM has arranged an appointment for him on 04/09/2021  · Hypercoagulable panel: pending upon discharge  Plan to have new PCP review these results with patient once available  · CT C/A/P with/without contrast to assess for any maligancy - no apparent malignancy noted on CT chest abdomen pelvis, however patient did have some pulmonary nodules with recommendation to for follow-up CT scan in 3 months  · Lower extremity venous dopplers negative for DVTs

## 2021-04-06 NOTE — DISCHARGE SUMMARY
[facility]  Discharge- Arlyss Bearded 1970, 45 y o  male MRN: [id number]  Unit/Bed#: -01 Encounter: [id number]  Primary Care Provider: No primary care provider on file  Date and time admitted to hospital: 4/1/2021 12:23 PM    * Essential hypertension  Assessment & Plan  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 as well as marijuana - denies recent use however may contribute to his BP  · CT head: No evidence of acute infarct or intracranial hemorrhage   Patchy areas of microvascular ischemic change with small lacunar infarcts   Asymmetric right transverse and sigmoid sinus commonly can be a normal variant  · Renal following and appreciate their input  BPs improving with recent adjustments  · Continue amlodipine 10 mg daily  · Discontinue hydralazine 50 mg every 8 hours  · Nephrology recommends starting Losartan 50mg-HCTZ 12 5 mg qd  · TTE significant for left ventricular hypertrophy likely secondary to long-standing uncontrolled hypertension    Current moderate episode of major depressive disorder without prior episode ([address] )  Assessment & Plan  · Started Zoloft 50 mg daily, providing Rx for management at home with follow up with new PCP arranged by CM    Embolic stroke [facility])  Assessment & Plan  · Patient presenting to the ED with 3-week history of slurred speech and gait disturbance  · On presentation, patient with hypertensive urgency as well as SRIRAM  · CT head negative for acute inchemia however showing multiple lacunar infarcts  · MRI brain: a few small foci of diffusion restriction bilateral cerebral hemispheres in 2 separate vascular territories, worrisome for acute embolic infarctions    · ECHO: EF 65% with grade 1 diastolic dysfunction  · CHRISTIANNE: No evidence of PFO or valvular vegetations  · Stroke pathway initiated  · Patient was already started on statin for elevated ascvd risk however increased dose to 40  · Started patient on aspirin  · Neurology consulted and appreciate their recommendations  · Carotid doppler reveals <50% stenosis in Right & Left internal carotid arteries  No significant vertebral or subclavian artery disease  · Patient will need placement of loop recorder/Zio patch to evaluate for underlying atrial fibrillation  CM has arranged an appointment for him on 04/09/2021  · Hypercoagulable panel: pending upon discharge  Plan to have new PCP review these results with patient once available    · CT C/A/P with/without contrast to assess for any maligancy - no apparent malignancy noted on CT chest abdomen pelvis, however patient did have some pulmonary nodules with recommendation to for follow-up CT scan in 3 months  · Lower extremity venous dopplers negative for DVTs      Hyperlipidemia  Assessment & Plan  · Will start patient on Atorvastatin 40 mg qhs as patient's calculated ASCVD Risk is 18 4%   · Lipid panel reviewed    Tobacco use  Assessment & Plan  · Admits to heavy smoking - 36 pack year smoker   · Offered nicotine patches however patient refused  · Counseled on smoking cessation    Drug use  Assessment & Plan  · Patient admits to using meth and marijuana  · Denies recent use    Stage 2 chronic kidney disease  Assessment & Plan  · Baseline creatinine: 1 3-1 5  · Patient has not seen any provider in the last 10 years and has not been on any maintenance medications  · Kidney and bladder ultrasound: normal  · Avoid nephrotoxins  · Avoid hypotensive episodes  · Nephrology consulted for optimization of renal function prior to imaging with contrast for embolic stroke work-up; patient not on ACEi/ARB, patient not on any diuretics - patient currently on fluids at this time and will receive a bolus prior to CT scan then will hydrate patient again after CT scan and maintain him on IVF  · Nephrology recommends starting Losartan 50 mg & HCTZ 12 5 mg daily for hypertension management  Leukocytosis-resolved as of 4/6/2021  Assessment & Plan  · Unclear etiology  · Will continue to monitor  · No infectious signs at this time    RESOLVED    Elevated troponin level not due to acute coronary syndrome-resolved as of 4/6/2021  Assessment & Plan  · Troponin elevated to 0 72 on admission, denies any chest pain however does have shortness of breath  · Troponin elevation in setting of significant hypertension, possibly secondary to demand  · TTE showing normal EF and concentric LVH likely secondary to longstanding HTN  · Given CVA, patient started on Atorvastatin 40 mg qhs  · Serial EKG - non-ischemic      Discharging Physician / Practitioner: Theresa Hernández PA-C  PCP: No primary care provider on file  Admission Date:   Admission Orders (From admission, onward)     Ordered        04/01/21 1347  Inpatient Admission  Once                   Discharge Date: 04/06/21    Resolved Problems  Date Reviewed: 4/6/2021          Resolved    Elevated troponin level not due to acute coronary syndrome 4/6/2021     Resolved by  Theresa Hernández PA-C    Leukocytosis 4/6/2021     Resolved by  Theresa Hernández PA-C          Consultations During Hospital Stay:  · Nephrology, Neurology    Procedures Performed:   · None    Significant Findings / Test Results:   · CTH negative for acute ischemia however showing multiple lacunar infarcts  · Brain MRI reveals small bilateral foci of diffusion restriction indicative of acute embolic infarctions, chronic infarctions  · Normal Kidney and bladder on US  · No evidence of acute or chronic DVT on bilateral lower limb venous duplex study  · Carotid complex doppler shows < 50% stenosis bilaterally  Incidental Findings:   · None      Test Results Pending at Discharge (will require follow up): · Hypercoag panel     Outpatient Tests Requested:  · ALDAIR evaluation, consultation with Cardiology outpatient for loop recorder/Zio patch, Follow-up with neurology outpatient s/p embolic stroke  Complications:  SRIRAM superimposed on Chronic kidney disease, since resolved  Reason for Admission: Hypertensive urgency    Hospital Course:     Shonna Rees is a 45 y o  male patient who originally presented to the Emergency Room on 4/1/2021 due to slurred speech and unsteady gait, found to have Hypertensive urgency with BP in the 200s/100s  Patient BP was lowered over the course of hospital stay, evaluated by Nephrology who also provided hypertension management input  Patient went through stroke work up and found to have bilateral embolic strokes on MRI with evidence of chronic infarction  Telemetry was non-revealing of any arrhythmias but  has scheduled an appointment for patient to visit Cardiology for outpatient follow-up with possible Loop recorder placement  Patient's BP is no longer hypertensive urgency, and his symptoms have been stabilized so that he is safe for discharge to home with close medical management of hypertension and follow-up with PCP, cardiology and neurology  Please see above list of diagnoses and related plan for additional information  Condition at Discharge: fair     Discharge Day Visit / Exam:     Subjective:  Patient is lying in bed, cooperative with exam and responds positively to chance of discharge today  Patient is in good spirits  Patient denies weakness, dizziness, headache, shortness of breath, chest pain, nausea, abdominal pain   Patient admits he was walking around last night and "felt great "  Vitals: Blood Pressure: 162/97 (04/06/21 0844)  Pulse: 68 (04/06/21 0844)  Temperature: 97 7 °F (36 5 °C) (04/06/21 0844)  Temp Source: Oral (04/06/21 0844)  Respirations: 18 (04/06/21 0844)  Height: 5' 10" (177 8 cm) (04/05/21 1004)  Weight - Scale: 95 kg (209 lb 8 oz) (04/06/21 0600)  SpO2: 96 % (04/06/21 0845)  Exam:   Physical Exam  Constitutional:       General: He is not in acute distress  Appearance: Normal appearance  He is normal weight  HENT:      Head: Normocephalic and atraumatic  Mouth/Throat:      Mouth: Mucous membranes are moist    Eyes:      General: No scleral icterus  Extraocular Movements: Extraocular movements intact  Neck:      Musculoskeletal: Normal range of motion  Cardiovascular:      Rate and Rhythm: Normal rate and regular rhythm  Heart sounds: No murmur  Pulmonary:      Effort: Pulmonary effort is normal  No respiratory distress  Breath sounds: Normal breath sounds  No wheezing, rhonchi or rales  Abdominal:      General: Bowel sounds are normal       Palpations: Abdomen is soft  Tenderness: There is no abdominal tenderness  Musculoskeletal: Normal range of motion  Skin:     General: Skin is warm  Findings: No rash  Neurological:      General: No focal deficit present  Mental Status: He is alert and oriented to person, place, and time  Psychiatric:         Mood and Affect: Mood normal          Behavior: Behavior normal        Discussion with Family: Discussed patient's status and disposition with his sister  She suggests he should be evaluated for ALDAIR after discharge and was encouraged to bring up this topic with patient  Sister emphasizes that patient must take responsibility for his own health  Discharge instructions/Information to patient and family:   See after visit summary for information provided to patient and family  Provisions for Follow-Up Care:  See after visit summary for information related to follow-up care and any pertinent home health orders  Disposition:     Home    Planned Readmission: None  Discharge Statement:  I spent 45 minutes discharging the patient  This time was spent on the day of discharge  I had direct contact with the patient on the day of discharge   Greater than 50% of the total time was spent examining patient, answering all patient questions, arranging and discussing plan of care with patient as well as directly providing post-discharge instructions  Additional time then spent on discharge activities  Discharge Medications:  See after visit summary for reconciled discharge medications provided to patient and family        ** Please Note: This note has been constructed using a voice recognition system **

## 2021-04-06 NOTE — PLAN OF CARE
Problem: Potential for Falls  Goal: Patient will remain free of falls  Description: INTERVENTIONS:  - Assess patient frequently for physical needs  -  Identify cognitive and physical deficits and behaviors that affect risk of falls    -  [place] fall precautions as indicated by assessment   - Educate patient/family on patient safety including physical limitations  - Instruct patient to call for assistance with activity based on assessment  - Modify environment to reduce risk of injury, call bell within reach  - Consider OT/PT consult to assist with strengthening/mobility  Outcome: Progressing     Problem: CARDIOVASCULAR - ADULT  Goal: Maintains optimal cardiac output and hemodynamic stability  Description: INTERVENTIONS:  - Monitor I/O, vital signs and rhythm  - Monitor for S/S and trends of decreased cardiac output  - Administer and titrate ordered vasoactive medications to optimize hemodynamic stability  - Assess quality of pulses, skin color and temperature  - Assess for signs of decreased coronary artery perfusion  - Instruct patient to report change in severity of symptoms  Outcome: Progressing  Goal: Absence of cardiac dysrhythmias or at baseline rhythm  Description: INTERVENTIONS:  - Continuous cardiac monitoring, vital signs, obtain 12 lead EKG if ordered  - Administer antiarrhythmic and heart rate control medications as ordered  - Monitor electrolytes and administer replacement therapy as ordered  Outcome: Progressing     Problem: PAIN - ADULT  Goal: Verbalizes/displays adequate comfort level or baseline comfort level  Description: Interventions:  - Encourage patient to monitor pain and request assistance  - Assess pain using appropriate pain scale  - Administer analgesics based on type and severity of pain and evaluate response  - Implement non-pharmacological measures as appropriate and evaluate response  - Consider cultural and social influences on pain and pain management  - Notify physician/advanced practitioner if interventions unsuccessful or patient reports new pain  Outcome: Progressing     Problem: INFECTION - ADULT  Goal: Absence or prevention of progression during hospitalization  Description: INTERVENTIONS:  - Assess and monitor for signs and symptoms of infection  - Monitor lab/diagnostic results  - Monitor all insertion sites, i e  indwelling lines, tubes, and drains  - Monitor endotracheal if appropriate and nasal secretions for changes in amount and color  - [place] appropriate cooling/warming therapies per order  - Administer medications as ordered  - Instruct and encourage patient and family to use good hand hygiene technique  - Identify and instruct in appropriate isolation precautions for identified infection/condition  Outcome: Progressing  Goal: Absence of fever/infection during neutropenic period  Description: INTERVENTIONS:  - Monitor WBC    Outcome: Progressing     Problem: SAFETY ADULT  Goal: Patient will remain free of falls  Description: INTERVENTIONS:  - Assess patient frequently for physical needs  -  Identify cognitive and physical deficits and behaviors that affect risk of falls    -  [place] fall precautions as indicated by assessment   - Educate patient/family on patient safety including physical limitations  - Instruct patient to call for assistance with activity based on assessment  - Modify environment to reduce risk of injury, call bell within reach  - Consider OT/PT consult to assist with strengthening/mobility  Outcome: Progressing  Goal: Maintain or return to baseline ADL function  Description: INTERVENTIONS:  -  Assess patient's ability to carry out ADLs; assess patient's baseline for ADL function and identify physical deficits which impact ability to perform ADLs (bathing, care of mouth/teeth, toileting, grooming, dressing, etc )  - Assess/evaluate cause of self-care deficits   - Assess range of motion  - Assess patient's mobility; develop plan if impaired  - Assess patient's need for assistive devices and provide as appropriate  - Encourage maximum independence but intervene and supervise when necessary  - Involve family in performance of ADLs  - Assess for home care needs following discharge   - Consider OT consult to assist with ADL evaluation and planning for discharge  - Provide patient education as appropriate  Outcome: Progressing  Goal: Maintain or return mobility status to optimal level  Description: INTERVENTIONS:  - Assess patient's baseline mobility status (ambulation, transfers, stairs, etc )    - Identify cognitive and physical deficits and behaviors that affect mobility  - Identify mobility aids required to assist with transfers and/or ambulation (gait belt, sit-to-stand, lift, walker, cane, etc )  - [place] fall precautions as indicated by assessment  - Record patient progress and toleration of activity level on Mobility SBAR; progress patient to next Phase/Stage  - Instruct patient to call for assistance with activity based on assessment  - Consider rehabilitation consult to assist with strengthening/weightbearing, etc   Outcome: Progressing     Problem: DISCHARGE PLANNING  Goal: Discharge to home or other facility with appropriate resources  Description: INTERVENTIONS:  - Identify barriers to discharge w/patient and caregiver  - Arrange for needed discharge resources and transportation as appropriate  - Identify discharge learning needs (meds, wound care, etc )  - Arrange for interpretive services to assist at discharge as needed  - Refer to Case Management Department for coordinating discharge planning if the patient needs post-hospital services based on physician/advanced practitioner order or complex needs related to functional status, cognitive ability, or social support system  Outcome: Progressing     Problem: Knowledge Deficit  Goal: Patient/family/caregiver demonstrates understanding of disease process, treatment plan, medications, and discharge instructions  Description: Complete learning assessment and assess knowledge base  Interventions:  - Provide teaching at level of understanding  - Provide teaching via preferred learning methods  Outcome: Progressing     Problem: NEUROSENSORY - ADULT  Goal: Achieves stable or improved neurological status  Description: INTERVENTIONS  - Monitor and report changes in neurological status  - Monitor vital signs such as temperature, blood pressure, glucose, and any other labs ordered   - Initiate measures to prevent increased intracranial pressure  - Monitor for seizure activity and implement precautions if appropriate      Outcome: Progressing  Goal: Achieves maximal functionality and self care  Description: INTERVENTIONS  - Monitor swallowing and airway patency with patient fatigue and changes in neurological status, monitor speech  - Encourage and assist patient to increase activity and self care     - Encourage visually impaired, hearing impaired and aphasic patients to use assistive/communication devices  Outcome: Progressing     Problem: RESPIRATORY - ADULT  Goal: Achieves optimal ventilation and oxygenation  Description: INTERVENTIONS:  - Assess for changes in respiratory status  - Assess for changes in mentation and behavior  - Position to facilitate oxygenation and minimize respiratory effort  - Oxygen administered by appropriate delivery if ordered  - Initiate smoking cessation education as indicated  - Encourage broncho-pulmonary hygiene including cough, deep breathe, Incentive Spirometry  - Assess the need for suctioning and aspirate as needed  - Assess and instruct to report SOB or any respiratory difficulty  - Respiratory Therapy support as indicated  Outcome: Progressing     Problem: GASTROINTESTINAL - ADULT  Goal: Minimal or absence of nausea and/or vomiting  Description: INTERVENTIONS:  - Administer IV fluids if ordered to ensure adequate hydration  - Maintain NPO status until nausea and vomiting are resolved  - Nasogastric tube if ordered  - Administer ordered antiemetic medications as needed  - Provide nonpharmacologic comfort measures as appropriate  - Advance diet as tolerated, if ordered  - Consider nutrition services referral to assist patient with adequate nutrition and appropriate food choices  Outcome: Progressing  Goal: Maintains or returns to baseline bowel function  Description: INTERVENTIONS:  - Assess bowel function  - Encourage oral fluids to ensure adequate hydration  - Administer IV fluids if ordered to ensure adequate hydration  - Administer ordered medications as needed  - Encourage mobilization and activity  - Consider nutritional services referral to assist patient with adequate nutrition and appropriate food choices  Outcome: Progressing  Goal: Maintains adequate nutritional intake  Description: INTERVENTIONS:  - Monitor percentage of each meal consumed  - Identify factors contributing to decreased intake, treat as appropriate  - Assist with meals as needed  - Monitor I&O, weight, and lab values if indicated  - Obtain nutrition services referral as needed  Outcome: Progressing  Goal: Establish and maintain optimal ostomy function  Description: INTERVENTIONS:  - Assess bowel function  - Encourage oral fluids to ensure adequate hydration  - Administer IV fluids if ordered to ensure adequate hydration   - Administer ordered medications as needed  - Encourage mobilization and activity  - Nutrition services referral to assist patient with appropriate food choices  - Assess stoma site  - Consider wound care consult   Outcome: Progressing     Problem: GENITOURINARY - ADULT  Goal: Maintains or returns to baseline urinary function  Description: INTERVENTIONS:  - Assess urinary function  - Encourage oral fluids to ensure adequate hydration if ordered  - Administer IV fluids as ordered to ensure adequate hydration  - Administer ordered medications as needed  - Offer frequent toileting  - Follow urinary retention protocol if ordered  Outcome: Progressing  Goal: Absence of urinary retention  Description: INTERVENTIONS:  - Assess patients ability to void and empty bladder  - Monitor I/O  - Bladder scan as needed  - Discuss with physician/AP medications to alleviate retention as needed  - Discuss catheterization for long term situations as appropriate  Outcome: Progressing  Goal: Urinary catheter remains patent  Description: INTERVENTIONS:  - Assess patency of urinary catheter  - If patient has a chronic quezada, consider changing catheter if non-functioning  - Follow guidelines for intermittent irrigation of non-functioning urinary catheter  Outcome: Progressing     Problem: METABOLIC, FLUID AND ELECTROLYTES - ADULT  Goal: Electrolytes maintained within normal limits  Description: INTERVENTIONS:  - Monitor labs and assess patient for signs and symptoms of electrolyte imbalances  - Administer electrolyte replacement as ordered  - Monitor response to electrolyte replacements, including repeat lab results as appropriate  - Instruct patient on fluid and nutrition as appropriate  Outcome: Progressing  Goal: Fluid balance maintained  Description: INTERVENTIONS:  - Monitor labs   - Monitor I/O and WT  - Instruct patient on fluid and nutrition as appropriate  - Assess for signs & symptoms of volume excess or deficit  Outcome: Progressing  Goal: Glucose maintained within target range  Description: INTERVENTIONS:  - Monitor Blood Glucose as ordered  - Assess for signs and symptoms of hyperglycemia and hypoglycemia  - Administer ordered medications to maintain glucose within target range  - Assess nutritional intake and initiate nutrition service referral as needed  Outcome: Progressing     Problem: SKIN/TISSUE INTEGRITY - ADULT  Goal: Skin integrity remains intact  Description: INTERVENTIONS  - Identify patients at risk for skin breakdown  - Assess and monitor skin integrity  - Assess and monitor nutrition and hydration status  - Monitor labs (i e  albumin)  - Assess for incontinence   - Turn and reposition patient  - Assist with mobility/ambulation  - Relieve pressure over bony prominences  - Avoid friction and shearing  - Provide appropriate hygiene as needed including keeping skin clean and dry  - Evaluate need for skin moisturizer/barrier cream  - Collaborate with interdisciplinary team (i e  Nutrition, Rehabilitation, etc )   - Patient/family teaching  Outcome: Progressing  Goal: Incision(s), wounds(s) or drain site(s) healing without S/S of infection  Description: INTERVENTIONS  - Assess and document risk factors for skin impairment   - Assess and document dressing, incision, wound bed, drain sites and surrounding tissue  - Consider nutrition services referral as needed  - Oral mucous membranes remain intact  - Provide patient/ family education  Outcome: Progressing  Goal: Oral mucous membranes remain intact  Description: INTERVENTIONS  - Assess oral mucosa and hygiene practices  - Implement preventative oral hygiene regimen  - Implement oral medicated treatments as ordered  - Initiate Nutrition services referral as needed  Outcome: Progressing     Problem: HEMATOLOGIC - ADULT  Goal: Maintains hematologic stability  Description: INTERVENTIONS  - Assess for signs and symptoms of bleeding or hemorrhage  - Monitor labs  - Administer supportive blood products/factors as ordered and appropriate  Outcome: Progressing     Problem: MUSCULOSKELETAL - ADULT  Goal: Maintain or return mobility to safest level of function  Description: INTERVENTIONS:  - Assess patient's ability to carry out ADLs; assess patient's baseline for ADL function and identify physical deficits which impact ability to perform ADLs (bathing, care of mouth/teeth, toileting, grooming, dressing, etc )  - Assess/evaluate cause of self-care deficits   - Assess range of motion  - Assess patient's mobility  - Assess patient's need for assistive devices and provide as appropriate  - Encourage maximum independence but intervene and supervise when necessary  - Involve family in performance of ADLs  - Assess for home care needs following discharge   - Consider OT consult to assist with ADL evaluation and planning for discharge  - Provide patient education as appropriate  Outcome: Progressing  Goal: Maintain proper alignment of affected body part  Description: INTERVENTIONS:  - Support, maintain and protect limb and body alignment  - Provide patient/ family with appropriate education  Outcome: Progressing     Problem: Neurological Deficit  Goal: Neurological status is stable or improving  Description: Interventions:  - Monitor and assess patient's level of consciousness, motor function, sensory function, and level of assistance needed for ADLs  - Monitor and report changes from baseline  Collaborate with interdisciplinary team to initiate plan and implement interventions as ordered  - Provide and maintain a safe environment  - Consider seizure precautions  - Consider fall precautions  - Consider aspiration precautions  - Consider bleeding precautions  Outcome: Progressing     Problem: Activity Intolerance/Impaired Mobility  Goal: Mobility/activity is maintained at optimum level for patient  Description: Interventions:  - Assess and monitor patient  barriers to mobility and need for assistive/adaptive devices  - Assess patient's emotional response to limitations  - Collaborate with interdisciplinary team and initiate plans and interventions as ordered  - Encourage independent activity per ability   - Maintain proper body alignment  - Perform active/passive rom as tolerated/ordered  - Plan activities to conserve energy   - Turn patient as appropriate  Outcome: Progressing     Problem: Communication Impairment  Goal: Ability to express needs and understand communication  Description: Assess patient's communication skills and ability to understand information    Patient will demonstrate use of effective communication techniques, alternative methods of communication and understanding even if not able to speak  - Encourage communication and provide alternate methods of communication as needed  - Collaborate with case management/ for discharge needs  - Include patient/family/caregiver in decisions related to communication  Outcome: Progressing     Problem: Potential for Aspiration  Goal: Non-ventilated patient's risk of aspiration is minimized  Description: Assess and monitor vital signs, respiratory status, and labs (WBC)  Monitor for signs of aspiration (tachypnea, cough, rales, wheezing, cyanosis, fever)  - Assess and monitor patient's ability to swallow  - Place patient up in chair to eat if possible  - HOB up at 90 degrees to eat if unable to get patient up into chair   - Supervise patient during oral intake  - Instruct patient/ family to take small bites  - Instruct patient/ family to take small single sips when taking liquids  - Follow patient-specific strategies generated by speech pathologist   Outcome: Progressing  Goal: Ventilated patient's risk of aspiration is minimized  Description: Assess and monitor vital signs, respiratory status, airway cuff pressure, and labs (WBC)  Monitor for signs of aspiration (tachypnea, cough, rales, wheezing, cyanosis, fever)  - Elevate head of bed 30 degrees if patient has tube feeding   - Monitor tube feeding  Outcome: Progressing     Problem: Nutrition  Goal: Nutrition/Hydration status is improving  Description: Monitor and assess patient's nutrition/hydration status for malnutrition (ex- brittle hair, bruises, dry skin, pale skin and conjunctiva, muscle wasting, smooth red tongue, and disorientation)  Collaborate with interdisciplinary team and initiate plan and interventions as ordered  Monitor patient's weight and dietary intake as ordered or per policy   Utilize nutrition screening tool and intervene per policy  Determine patient's food preferences and provide high-protein, high-caloric foods as appropriate  - Assist patient with eating   - Allow adequate time for meals   - Encourage patient to take dietary supplement as ordered  - Collaborate with clinical nutritionist   - Include patient/family/caregiver in decisions related to nutrition  Outcome: Progressing     Problem: Nutrition/Hydration-ADULT  Goal: Nutrient/Hydration intake appropriate for improving, restoring or maintaining nutritional needs  Description: Monitor and assess patient's nutrition/hydration status for malnutrition  Collaborate with interdisciplinary team and initiate plan and interventions as ordered  Monitor patient's weight and dietary intake as ordered or per policy  Utilize nutrition screening tool and intervene as necessary  Determine patient's food preferences and provide high-protein, high-caloric foods as appropriate       INTERVENTIONS:  - Monitor oral intake, urinary output, labs, and treatment plans  - Assess nutrition and hydration status and recommend course of action  - Evaluate amount of meals eaten  - Assist patient with eating if necessary   - Allow adequate time for meals  - Recommend/ encourage appropriate diets, oral nutritional supplements, and vitamin/mineral supplements  - Order, calculate, and assess calorie counts as needed  - Recommend, monitor, and adjust tube feedings and TPN/PPN based on assessed needs  - Assess need for intravenous fluids  - Provide specific nutrition/hydration education as appropriate  - Include patient/family/caregiver in decisions related to nutrition  Outcome: Progressing

## 2021-04-06 NOTE — ASSESSMENT & PLAN NOTE
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 as well as marijuana - denies recent use however may contribute to his BP  · CT head: No evidence of acute infarct or intracranial hemorrhage   Patchy areas of microvascular ischemic change with small lacunar infarcts   Asymmetric right transverse and sigmoid sinus commonly can be a normal variant  · Renal following and appreciate their input  BPs improving with recent adjustments  · Continue amlodipine 10 mg daily  · Discontinue hydralazine 50 mg every 8 hours  · Nephrology recommends starting Losartan 50mg-HCTZ 12 5 mg qd    · TTE significant for left ventricular hypertrophy likely secondary to long-standing uncontrolled hypertension

## 2021-04-07 LAB
APTT SCREEN TO CONFIRM RATIO: 1.3 RATIO (ref 0–1.4)
B2 GLYCOPROT1 IGA SERPL IA-ACNC: 2.2
B2 GLYCOPROT1 IGG SERPL IA-ACNC: 0.8
B2 GLYCOPROT1 IGM SERPL IA-ACNC: <2.9
CARDIOLIPIN IGA SER IA-ACNC: 2.3
CARDIOLIPIN IGG SER IA-ACNC: 0.5
CARDIOLIPIN IGM SER IA-ACNC: 1.1
CONFIRM APTT/NORMAL: 41.5 SEC (ref 0–55)
LA PPP-IMP: NORMAL
PROT C AG ACT/NOR PPP IA: 148 % OF NORMAL (ref 60–150)
PROT S ACT/NOR PPP: 126 % (ref 71–117)
SCREEN APTT: 35.9 SEC (ref 0–51.9)
SCREEN DRVVT: 36 SEC (ref 0–47)
THROMBIN TIME: 17 SEC (ref 0–23)

## 2021-04-08 LAB — F5 GENE MUT ANL BLD/T: NORMAL

## 2021-04-09 DIAGNOSIS — I63.9 EMBOLIC STROKE (HCC): ICD-10-CM

## 2021-04-09 DIAGNOSIS — D68.59 ANTITHROMBIN 3 DEFICIENCY (HCC): Primary | ICD-10-CM

## 2021-04-09 LAB — F2 GENE MUT ANL BLD/T: NORMAL

## 2021-04-12 ENCOUNTER — TELEPHONE (OUTPATIENT)
Dept: NEUROLOGY | Facility: CLINIC | Age: 51
End: 2021-04-12

## 2021-04-12 NOTE — TELEPHONE ENCOUNTER
1ST ATTEMPT LMOM for pt to call in to sched HFU appt  GSL/Stroke/VERIFY INSURANCE???    NOTE FROM CHART:  Reason for Consult / Principal Problem: stroke  Mony Needs will need follow up in in 4 weeks with neurovascular attending or advance practitioner  He will not require outpatient neurological testing  Thrombosis panel should be followed up on   Will need ziopatch or loop recorder monitoring   However patient does not have insurance at this, primary team is discussing options with case management for outpatient follow-up

## 2021-04-13 NOTE — TELEPHONE ENCOUNTER
2ND ATTEMPT Called no answer, VM full, unable to leave message  Will try again tomorrow       GSL/Stroke/VERIFY INSURANCE???

## 2021-04-14 NOTE — TELEPHONE ENCOUNTER
3RD ATTEMPT Called no answer, VM full, unable to leave message  Will try again tomorrow     Mailed letter to pt's home       GSL/Stroke/VERIFY INSURANCE???

## 2021-04-20 ENCOUNTER — TELEPHONE (OUTPATIENT)
Dept: NEUROLOGY | Facility: CLINIC | Age: 51
End: 2021-04-20

## 2021-04-20 NOTE — TELEPHONE ENCOUNTER
----- Message from Yaz Landry RN sent at 4/9/2021  2:10 PM EDT -----    ----- Message -----  From: Shan Chavarria DO  Sent: 4/9/2021   1:44 PM EDT  To: Neu[facility] [place]    Please let patient know antithrombin 3 activity mildly low- ? If this increases blood clot formation, will send to hematology for further work up as warranted   Thank you

## 2021-04-20 NOTE — TELEPHONE ENCOUNTER
Called patient and made him aware of results  Provided patient with contact information to Hematology   Patient will call and follow up

## 2021-05-19 NOTE — UTILIZATION REVIEW
URGENT/EMERGENT  INPATIENT/SPU AUTHORIZATION REQUEST    Date: 5/27/2021            # Pages in this Request:      New Request  X Additional Information for PA#: Updated review - demographic information - discussed with Fatemeh Crocker Name:  Jose R Scruggs Title: Utilization Review, [facility]     Phone: [phone]  Ext  Availability (Date/Time): Wednesday - Friday 8 am- 4 pm    x Inpatient Review  SPU Review        Current       x Late Pick-up   · How your facility was first notified of the Late Pick-up:  · When your facility was first notified of the Late Pick-up (date):         [facility] INFORMATION    Recipient ID#: [id number]   Recipient Name:  Mony Ann     YOB: 1970  45 y o  Recipient Alias:     Gender:  X Male  Female Medicaid Eligibility ([address] [place] [address]): INSURANCE INFORMATION    (All other private or governmental health insurance benefits must be utilized prior to billing the [place] Program)    Was this admission the result of an MVA, other accident, assault, injury, fall, gunshot, bite etc ? Yes x No                   If yes, provide a brief description of the incident  Does the recipient have other insurance coverage? Yes x No        Insurance Company Name/Policy #      Did that insurance pay on this claim? Yes  No        Did that insurance deny this claim? Yes  No    If yes, reason for denial:      Does the recipient have Medicare? Yes x No        Did Medicare exhaust prior to this admission? Yes  No        Did Medicare partially pay this claim? Yes  No        Did that insurance deny this claim? Yes  No    If yes, reason for denial:          Was the recipient a prisoner at the time of admission?   Yes x No            PROVIDER INFORMATION    Hospital Name: [address] [place] [address] Provider ID#: [id number]    [address] [place] [address] Physician Name: Padma Tabor Provider ID#: [id number]        ADMISSION INFORMATION    Type of Admission: (please choose one)    X ED      Direct    If yes, from where? Transfer    If yes, transferring hospital (inpatient, rehab, or psych) Provider Name/Provider ID#: Admission Floor or Unit Type: Med Surg     Dates/Times:        ED Date/Time: 4/1/2021 12:23 PM        Observation Date/Time:         Admission Date/Time: 4/1/21 1347        Discharge or Transfer Date/Time: 4/6/2021 12:59 PM        DIAGNOSIS/PROCEDURE CODES    Primary Diagnosis Code/Primary Diagnosis Code description:  I63 40 Cerebral infarction due to embolism of unspecified cerebral artery   N17 9 Acute kidney failure, unspecified   D72 829 Elevated white blood cell count, unspecified   E78 5 Hyperlipidemia, unspecified   Additional Diagnosis Code(s) and Description(s)-(up to three additional codes):    Procedure Code (one) and description:        CLINICAL INFORMATION - PRIOR ADMISSION ONLY    Is there a prior admission with a discharge date within 30 days of the date of this admission? X No (Proceed to the next section - "Clinical Information - General Review Checklist:)      Yes (Provide the following information)     Prior admission dates:    MA Prior Authorization Number:        Review Outcome:     Diagnosis Code(s)/Description:    Procedure Code/Description:    Findings:    Treatment:    Condition on Discharge:   Vitals:    Labs:   Imaging:   Medications: Follow-up Instructions:    Disposition:        CLINICAL INFORMATION - GENERAL REVIEW CHECKLIST    EMERGENCY DEPARTMENT: (Proceed to "ADMISSION" if Direct Admission)    Presenting Signs/Symptoms:    45year old male to the ED from home with complaints of feeling off balance, slurred speech for 3 weeks prior to arrival  Admitted to inpatient for hypertensive urgency, elevated troponin, gracie  Arrives with  Very elevated bp  Given IV hydralazine, IV metoprolol and catapres po in the ED  CT head shows small lacunar infarcts  Start amlodipine and prn labetalol  GCS 15    Troponin elevated likely in the setting of htn  Continue to trend  Monitor tele  Check ECHO  Creat on arrival is 1 53 with unknown baseline    Medication/treatment prior to arrival in the ED:    Past Medical History:     Past Medical History:   Diagnosis Date    Gout        Clinical Exam:    Initial Vital Signs: (Temp, Pulse, Resp, and BP)   ED Triage Vitals   Temperature Pulse Respirations Blood Pressure SpO2   04/01/21 1224 04/01/21 1224 04/01/21 1224 04/01/21 1224 04/01/21 1224   97 6 °F (36 4 °C) 100 19 (!) 232/142 99 %      Temp Source Heart Rate Source Patient Position - Orthostatic VS BP Location FiO2 (%)   04/01/21 1224 04/01/21 1224 04/01/21 1224 04/01/21 1224 --   Temporal Monitor Lying Right arm       Pain Score       04/01/21 1800       No Pain           Pertinent Repeat Vital Signs: (include times they were obtained)  Time   Temp Pulse Resp   BP   MAP (mmHg)   SpO2   O2 Device Patient Position - Orthostatic VS   04/02/21 0900   -- 59 18   158/92   117   95 %   None (Room air) Lying   04/02/21 0700   98 9 °F (37 2 °C) 63 18   150/81   108   95 %   None (Room air) Lying   04/02/21 0554   -- 65 20   181/102Abnormal    133   94 %   None (Room air) Lying   04/02/21 0200   -- 67 18   --   --   96 %   -- --   04/02/21 0005   97 9 °F (36 6 °C) 59 19   177/101Abnormal    133   97 %   None (Room air) Lying   04/01/21 2000   -- -- --   --   --   --   None (Room air) --   04/01/21 1827   -- 66 23Abnormal    189/109Abnormal    143   97 %   -- --   04/01/21 1718   98 °F (36 7 °C) 71 20   192/112Abnormal    145   99 %   None (Room air) Sitting   04/01/21 1630   -- 69 20   191/109Abnormal    145   96 %   None (Room air) Lying   04/01/21 1600   -- 66 20   182/103Abnormal    136   96 %   None (Room air) Lying   04/01/21 1438   -- 68 --   179/106Abnormal    --   96 %   None (Room air) Lying   04/01/21 1431   -- 71 38Abnormal    177/95Abnormal    --   95 %   None (Room air) Lying   04/01/21 1300   -- 99 33Abnormal    190/103Abnormal    140   97 %   None (Room air) Lying   04/01/21 1224   97 6 °F (36 4 °C) 100 19   232/142Abnormal    --   99 %                 Pertinent Sustained Findings: (include times they were obtained)    Weight in Kilograms:  Wt Readings from Last 1 Encounters:   04/06/21 95 kg (209 lb 8 oz)       Pertinent Labs (results):  Results from last 7 days   Lab Units 04/02/21  0603 04/01/21  1854 04/01/21  1231   WBC Thousand/uL 10 09  --  12 40*   HEMOGLOBIN g/dL 15 8  --  16 9   HEMATOCRIT % 46 5  --  49 2   PLATELETS Thousands/uL 297 296 324   NEUTROS ABS Thousands/µL 5 35  --  8 84*                Results from last 7 days   Lab Units 04/02/21  0603 04/01/21  1231   SODIUM mmol/L 138 140   POTASSIUM mmol/L 3 6 3 6   CHLORIDE mmol/L 102 99*   CO2 mmol/L 29 33*   ANION GAP mmol/L 7 8   BUN mg/dL 17 17   CREATININE mg/dL 1 38* 1 53*   EGFR ml/min/1 73sq m 59 52   CALCIUM mg/dL 8 7 9 3   MAGNESIUM mg/dL 2 0  --    PHOSPHORUS mg/dL 3 5  --             Results from last 7 days   Lab Units 04/02/21  0603 04/01/21  1231   AST U/L 19 18   ALT U/L 33 32   ALK PHOS U/L 65 84   TOTAL PROTEIN g/dL 6 6 7 6   ALBUMIN g/dL 3 3* 3 8   TOTAL BILIRUBIN mg/dL 0 76 0 47                Results from last 7 days   Lab Units 04/02/21  0603 04/01/21  1231   GLUCOSE RANDOM mg/dL 116 100               Results from last 7 days   Lab Units 04/01/21  1854   HEMOGLOBIN A1C % 5 8*   EAG mg/dl 120              Results from last 7 days   Lab Units 04/02/21  0918 04/01/21  2139 04/01/21  1854 04/01/21  1231   TROPONIN I ng/mL 0 46* 0 89* 0 89* 0 72*               Results from last 7 days   Lab Units 04/01/21  1231   PROTIME seconds 11 9   INR   0 89   PTT seconds 28              Results from last 7 days   Lab Units 04/01/21  1231   LACTIC ACID mmol/L 1 3         Results from last 7 days   Lab Units 04/01/21  1231   NT-PRO BNP pg/mL 514*               Results from last 7 days   Lab Units 04/01/21  1231   LIPASE u/L 88        Radiology (results):  CT head: No evidence of acute infarct or intracranial hemorrhage  Patchy areas of microvascular ischemic change with small lacunar infarcts  Asymmetric right transverse and sigmoid sinus commonly can be a normal variant   If there is significant posterior fossa symptomatology clinically, further imaging assessment could be performed  4/1 CXR: No acute cardiopulmonary disease  EKG (results):   4/1 EKG:  Previous ECG:  Unavailable  Interpretation:     Interpretation: non-specific    Rate:     ECG rate:  90  Rhythm:     Rhythm: sinus rhythm    Ectopy:     Ectopy: none    QRS:     QRS axis:  Normal  Other tests (results):    Tests pending final results:    Treatment in the ED:   Medication Administration from 04/01/2021 1218 to 04/01/2021 1721       Date/Time Order Dose Route Action Comments     04/01/2021 1235 hydrALAZINE (APRESOLINE) injection 10 mg 10 mg Intravenous Given /115     04/01/2021 1313 metoprolol (LOPRESSOR) injection 5 mg 5 mg Intravenous Given      04/01/2021 1431 cloNIDine (CATAPRES) tablet 0 2 mg 0 2 mg Oral Given            Other treatments:      Change in condition while in the ED:     Response to ED Treatment:          OBSERVATION: (Proceed to "ADMISSION" if Direct Admission)    Orders written during the observation period  Meds Name, dose, route, time, how may doses given:  PRN Meds Name, dose, route, time, how many doses given within the first 24 hrs :  IVs Type, rate, and total amt  ordered/given:  Labs, imaging, other:  Consults and findings:    Test Results during the observation period  Pertinent Lab tests (dates/results):  Culture results (blood, urine, spinal, wound, respiratory, etc ):  Imaging tests (dates/results):  EKG (dates/results):   Other test (dates/results):  Tests pending (dates/results):    Surgical or Invasive Procedures during the observation period  Name of surgery/procedure:  Date & Time:  Patient Response:  Post-operative orders:  Operative Report/Findings:    Response to Treatment, Major Change in Condition, Major Charge in Treatment during the observation period          ADMISSION:    DIRECT Admissions Only:    · Presenting Signs/Symptoms:   ·   · Medication/treatment prior to arrival:  ·   · Past Medical History:  ·   · Clinical Exam on admission:  ·   · Vital Signs on admission: (Temp, Pulse, Resp, and BP)  ·   · Weight in kilograms:     ALL Admissions:    Admission Orders and Other Orders written within the first 24 hrs after admission    Neuro checks  I/O      Meds Name, dose, route, time, how may doses given:  amLODIPine, 10 mg, Oral, Daily  atorvastatin, 20 mg, Oral, Daily With Dinner  heparin (porcine), 5,000 Units, Subcutaneous, Q8H [address]      PRN Meds Name, dose, route, time, how many doses given within the first 24 hrs :    Labetalol HCl, 10 mg, Intravenous, Q6H PRN    IVs Type, rate, and total amt  Ordered/given:    sodium chloride, 100 mL/hr, Intravenous, Continuous       Labs, imaging, other:      Consults and findings:  Neurology - 3/7 - 56-year-old male, with history of methamphetamine abuse, UDS + for this on admit,  Suspect hypertension-patient has not seen a PCP in years, tobacco abuse, hyperlipidemia noted on workup today seen in consultation for stroke-like symptoms  Patient states that he has had 2 weeks dysarthria and slightly altered gait  States he attributed his dysarthria to a feeling that he had just gotten done however when he went to his dentist told him that this is not the cause of this problem thus came to the ER  He denies any prior history similar, denies prior history of known stroke  Denies any blood clots  Denies any cardiac disease, chest pain or heart palpitations        Does state that he feels a little better than when he came in especially with his speech  He states speech therapy saw him and did recommend outpatient speech therapy     He was not taking an aspirin or any other medication at home        He denies any one-sided weakness or vision changes or dizziness  I  reviewed his MRI brain in PACS and discussed it with the patient,  Does have ischemic infarcts in bilateral cerebral hemispheres  CHRISTIANNE completed earlier today with no evidence of intracardiac  Thrombus or reduced EF or left atrial dilatation or PFO  Essentially normal           Patient has been hypertensive here up to 202 systolic  No fevers and nontoxic appearing  Family history of mom with clotting disorder and 3 strokes per him      Bilateral frontal ischemic infarcts - ? Embolic etiology  -  Stroke risk factors including uncontrolled hypertension - patient has not seen a PCP in years, methamphetamine use, hyperlipidemia, tobacco abuse  Secondary stroke prevention: Aspirin 81 mg and statin  I discussed at length the risk factor modification for stroke with patient including stopping methamphetamine use,  Better blood pressure control- defer to primary team starting antihypertensives at this point is been over 48 hours since patient has acute stroke symptoms this okay to have goal normotension, recommend tobacco cessation, recommend better diet for controlling hyperlipidemia and borderline elevated hyperglycemia       CHRISTIANNE does not show any significant abnormality  However do recommend loop recorder or ZIO patch given bilateral ischemic infarcts      - Recommend checking thrombosis panel with family hx of stroke and clotting disorder in mom  - Check CUS  - Venous dopplers bl LE- no thrombus    Nephrology - 4/3 - Renal optimization to reduce incidence of acute kidney injury:   -baseline creatinine: unclear, noted to be 1 7 in October 2020 and 1 1 in July 2020   -presents with creatinine of 1 53     -will check urinalysis  -currently on normal saline at 100 cc/hour   -renal ultrasound shows normal echogenicity and contour  -patient with potential need for IV contrast administration    -recommend hydrating pre and post contrast administration   -avoid ACE-inhibitor/Arb/diuretic morning of contrast administration  -holding parameters adjusted on antihypertensives  -recommend avoiding nephrotoxins, hypotension, minimize IV contrast   -I/O  Test Results after admission  Pertinent Lab tests (dates/results):  Culture results (blood, urine, spinal, wound, respiratory, etc ):  Imaging tests (dates/results):  EKG (dates/results): Other test (dates/results):  Tests pending (dates/results):    Surgical or Invasive Procedures  Name of surgery/procedure:  Date & Time:  Patient Response:  Post-operative orders:  Operative Report/Findings:    Response to Treatment, Major Change in Condition, Major Charge in Treatment anytime during admission  4/2 Progress notes; Acute CVA, Hypertensive urgency  Start on aspirin  Neuro checks  Neurology consult  Needs CHRISTIANNE  Possible Loop  Hypercoagulable panel  BP still elevated despite hydralazine  Add order prn labetalol and start amlodipine daily and BP improving  Tele monitoring  Given embolic strokes, will start patient on Atorvastatin 40 mg qhs  Serial EKG  Creat improving       Hospital Course:   Pattie Gifford is a 45 y o  male patient who originally presented to the Emergency Room on 4/1/2021 due to slurred speech and unsteady gait, found to have Hypertensive urgency with BP in the 200s/100s  Patient BP was lowered over the course of hospital stay, evaluated by Nephrology who also provided hypertension management input  Patient went through stroke work up and found to have bilateral embolic strokes on MRI with evidence of chronic infarction  Telemetry was non-revealing of any arrhythmias but  has scheduled an appointment for patient to visit Cardiology for outpatient follow-up with possible Loop recorder placement   Patient's BP is no longer hypertensive urgency, and his symptoms have been stabilized so that he is safe for discharge to home with close medical management of hypertension and follow-up with PCP, cardiology and neurology        Disposition on Discharge  Home, Rehab, SNF, LTC, Shelter, etc : Home/Self Care    Cease to Breathe (CTB)  If a patient expires during an admission, in addition to the above information, please include:    Summary/timeline of the patient's decline in condition:    Medications and treatment:    Patient response to treatment:    Date and time patient ceased to breathe:        Is there a Readmission that follows this admission? Yes X No    If yes, provide dates:          InterQual Review    InterQual Criteria Met:  Yes X No  N/A        Please include the InterQual Review, InterQual year/version used, and the criteria selected:   Created Using Review Status Review Entered   [employer]®  In Primary 4/2/2021 10:19       Criteria Set Name - Subset   LOC:Acute Adult-Hypertension      Criteria Review   REVIEW SUMMARY     Patient: Linda Blanca  Review Number: [id number]  Review Status: In Primary  Criteria Status: Not Met     Condition Specific: Yes        OUTCOMES  Outcome Type: Primary           REVIEW DETAILS     Product: Hyla Pong Adult  Subset: Hypertension      (Symptom or finding within 24h)         (Excludes PO medications unless noted)     Version: [employer]ark[employer] 2020  Blayne Flannery and [employer]  © 2020 [employer] and/or one of its subsidiaries  All Rights Reserved  CPT only © 2019 [employer] Association  All Rights Reserved  PLEASE SUBMIT THE COMPLETED FORM TO THE DEPARTMENT OF HUMAN SERVICES - DIVISION OF CLINICAL  REVIEW VIA FAX -[phone] or VIA E-MAIL TO [employer]    Signature: Maile Norton Date:  05/19/21    Confidentiality Notice: The documents accompanying this telecopy may contain confidential information belonging to the sender  The information is intended only for the use of the individual named above   If you are not the intended recipient, you are hereby notified  That any disclosure, copying, distribution or taking of any telecopy is strictly prohibited

## 2022-03-30 DIAGNOSIS — R60.0 LOCALIZED EDEMA: ICD-10-CM

## 2022-03-30 DIAGNOSIS — I10 ESSENTIAL (PRIMARY) HYPERTENSION: ICD-10-CM

## 2022-04-06 ENCOUNTER — TELEPHONE (OUTPATIENT)
Dept: PREADMISSION TESTING | Facility: HOSPITAL | Age: 52
End: 2022-04-06

## 2022-04-06 VITALS — WEIGHT: 219 LBS | BODY MASS INDEX: 31.35 KG/M2 | HEIGHT: 70 IN

## 2022-04-06 NOTE — TELEPHONE ENCOUNTER
Patient called back stating he needs to cancel colonoscopy and reschedule for a later time  No action needed

## 2022-07-26 ENCOUNTER — HOSPITAL ENCOUNTER (EMERGENCY)
Facility: HOSPITAL | Age: 52
Discharge: HOME | End: 2022-07-26
Attending: EMERGENCY MEDICINE | Admitting: EMERGENCY MEDICINE
Payer: COMMERCIAL

## 2022-07-26 ENCOUNTER — APPOINTMENT (EMERGENCY)
Dept: CT IMAGING | Facility: HOSPITAL | Age: 52
End: 2022-07-26
Payer: COMMERCIAL

## 2022-07-26 VITALS
OXYGEN SATURATION: 97 % | SYSTOLIC BLOOD PRESSURE: 114 MMHG | RESPIRATION RATE: 19 BRPM | DIASTOLIC BLOOD PRESSURE: 71 MMHG | BODY MASS INDEX: 29.89 KG/M2 | HEART RATE: 56 BPM | TEMPERATURE: 97.3 F | WEIGHT: 208.34 LBS

## 2022-07-26 DIAGNOSIS — U07.1 COVID-19 VIRUS INFECTION: Primary | ICD-10-CM

## 2022-07-26 LAB
ALBUMIN SERPL BCP-MCNC: 3.8 G/DL (ref 3.5–5)
ALP SERPL-CCNC: 77 U/L (ref 46–116)
ALT SERPL W P-5'-P-CCNC: 42 U/L (ref 12–78)
ANION GAP SERPL CALCULATED.3IONS-SCNC: 6 MMOL/L (ref 4–13)
APTT PPP: 29 SECONDS (ref 23–37)
AST SERPL W P-5'-P-CCNC: 28 U/L (ref 5–45)
BASOPHILS # BLD AUTO: 0.03 THOUSANDS/ΜL (ref 0–0.1)
BASOPHILS NFR BLD AUTO: 1 % (ref 0–1)
BILIRUB SERPL-MCNC: 0.52 MG/DL (ref 0.2–1)
BUN SERPL-MCNC: 14 MG/DL (ref 5–25)
CALCIUM SERPL-MCNC: 8.9 MG/DL (ref 8.3–10.1)
CARDIAC TROPONIN I PNL SERPL HS: 8 NG/L
CHLORIDE SERPL-SCNC: 102 MMOL/L (ref 96–108)
CO2 SERPL-SCNC: 31 MMOL/L (ref 21–32)
CREAT SERPL-MCNC: 1.66 MG/DL (ref 0.6–1.3)
EOSINOPHIL # BLD AUTO: 0.26 THOUSAND/ΜL (ref 0–0.61)
EOSINOPHIL NFR BLD AUTO: 6 % (ref 0–6)
ERYTHROCYTE [DISTWIDTH] IN BLOOD BY AUTOMATED COUNT: 14.5 % (ref 11.6–15.1)
ETHANOL SERPL-MCNC: <3 MG/DL (ref 0–3)
FLUAV RNA RESP QL NAA+PROBE: NEGATIVE
FLUBV RNA RESP QL NAA+PROBE: NEGATIVE
GFR SERPL CREATININE-BSD FRML MDRD: 46 ML/MIN/1.73SQ M
GLUCOSE SERPL-MCNC: 63 MG/DL (ref 65–140)
HCT VFR BLD AUTO: 49.1 % (ref 36.5–49.3)
HGB BLD-MCNC: 16.4 G/DL (ref 12–17)
IMM GRANULOCYTES # BLD AUTO: 0.01 THOUSAND/UL (ref 0–0.2)
IMM GRANULOCYTES NFR BLD AUTO: 0 % (ref 0–2)
INR PPP: 0.92 (ref 0.84–1.19)
LACTATE SERPL-SCNC: 1.5 MMOL/L (ref 0.5–2)
LIPASE SERPL-CCNC: 274 U/L (ref 73–393)
LYMPHOCYTES # BLD AUTO: 1.54 THOUSANDS/ΜL (ref 0.6–4.47)
LYMPHOCYTES NFR BLD AUTO: 35 % (ref 14–44)
MCH RBC QN AUTO: 30.7 PG (ref 26.8–34.3)
MCHC RBC AUTO-ENTMCNC: 33.4 G/DL (ref 31.4–37.4)
MCV RBC AUTO: 92 FL (ref 82–98)
MONOCYTES # BLD AUTO: 0.53 THOUSAND/ΜL (ref 0.17–1.22)
MONOCYTES NFR BLD AUTO: 12 % (ref 4–12)
NEUTROPHILS # BLD AUTO: 2.02 THOUSANDS/ΜL (ref 1.85–7.62)
NEUTS SEG NFR BLD AUTO: 46 % (ref 43–75)
NRBC BLD AUTO-RTO: 0 /100 WBCS
PLATELET # BLD AUTO: 286 THOUSANDS/UL (ref 149–390)
PMV BLD AUTO: 9.9 FL (ref 8.9–12.7)
POTASSIUM SERPL-SCNC: 3.8 MMOL/L (ref 3.5–5.3)
PROT SERPL-MCNC: 7.8 G/DL (ref 6.4–8.4)
PROTHROMBIN TIME: 12.5 SECONDS (ref 11.6–14.5)
RBC # BLD AUTO: 5.35 MILLION/UL (ref 3.88–5.62)
RSV RNA RESP QL NAA+PROBE: NEGATIVE
SARS-COV-2 RNA RESP QL NAA+PROBE: POSITIVE
SODIUM SERPL-SCNC: 139 MMOL/L (ref 135–147)
WBC # BLD AUTO: 4.39 THOUSAND/UL (ref 4.31–10.16)

## 2022-07-26 PROCEDURE — 99285 EMERGENCY DEPT VISIT HI MDM: CPT | Performed by: EMERGENCY MEDICINE

## 2022-07-26 PROCEDURE — 84484 ASSAY OF TROPONIN QUANT: CPT | Performed by: EMERGENCY MEDICINE

## 2022-07-26 PROCEDURE — 83605 ASSAY OF LACTIC ACID: CPT | Performed by: EMERGENCY MEDICINE

## 2022-07-26 PROCEDURE — 85730 THROMBOPLASTIN TIME PARTIAL: CPT | Performed by: EMERGENCY MEDICINE

## 2022-07-26 PROCEDURE — 85610 PROTHROMBIN TIME: CPT | Performed by: EMERGENCY MEDICINE

## 2022-07-26 PROCEDURE — 36415 COLL VENOUS BLD VENIPUNCTURE: CPT | Performed by: EMERGENCY MEDICINE

## 2022-07-26 PROCEDURE — G1004 CDSM NDSC: HCPCS

## 2022-07-26 PROCEDURE — 80053 COMPREHEN METABOLIC PANEL: CPT | Performed by: EMERGENCY MEDICINE

## 2022-07-26 PROCEDURE — 99284 EMERGENCY DEPT VISIT MOD MDM: CPT

## 2022-07-26 PROCEDURE — 85025 COMPLETE CBC W/AUTO DIFF WBC: CPT | Performed by: EMERGENCY MEDICINE

## 2022-07-26 PROCEDURE — 0241U HB NFCT DS VIR RESP RNA 4 TRGT: CPT | Performed by: EMERGENCY MEDICINE

## 2022-07-26 PROCEDURE — 96360 HYDRATION IV INFUSION INIT: CPT

## 2022-07-26 PROCEDURE — 93005 ELECTROCARDIOGRAM TRACING: CPT

## 2022-07-26 PROCEDURE — 70450 CT HEAD/BRAIN W/O DYE: CPT

## 2022-07-26 PROCEDURE — 82077 ASSAY SPEC XCP UR&BREATH IA: CPT | Performed by: EMERGENCY MEDICINE

## 2022-07-26 PROCEDURE — 83690 ASSAY OF LIPASE: CPT | Performed by: EMERGENCY MEDICINE

## 2022-07-26 RX ADMIN — SODIUM CHLORIDE 1000 ML: 9 INJECTION, SOLUTION INTRAVENOUS at 21:22

## 2022-07-26 NOTE — Clinical Note
Mony Seth was seen and treated in our emergency department on 7/26/2022  Diagnosis:     Sis Reyna  may return to work on return date  He may return on this date: 08/06/2022         If you have any questions or concerns, please don't hesitate to call        Gabi Lopez MD    ______________________________           _______________          _______________  Hospital Representative                              Date                                Time

## 2022-07-27 ENCOUNTER — TELEPHONE (OUTPATIENT)
Dept: FAMILY MEDICINE CLINIC | Facility: CLINIC | Age: 52
End: 2022-07-27

## 2022-07-27 LAB
ATRIAL RATE: 62 BPM
P AXIS: 45 DEGREES
PR INTERVAL: 138 MS
QRS AXIS: 69 DEGREES
QRSD INTERVAL: 94 MS
QT INTERVAL: 422 MS
QTC INTERVAL: 428 MS
T WAVE AXIS: 64 DEGREES
VENTRICULAR RATE: 62 BPM

## 2022-07-27 NOTE — TELEPHONE ENCOUNTER
----- Message from Alida Favre, MD sent at 7/26/2022 10:37 PM EDT -----  Regarding: MAB  This is for [facility]  Medical record #[id number]    YOB: 1970

## 2022-07-27 NOTE — DISCHARGE INSTRUCTIONS
Please follow-up with her family doctor  Please obtain a portable pulse ox monitor  Please return if your symptoms worsen  Please return for increased shortness of breath  Please return if your pulse ox monitor registered her oxygen level below 90%  I have referred you to our COVID follow-up team for possible monoclonal antibodies

## 2022-07-27 NOTE — ED PROVIDER NOTES
History  Chief Complaint   Patient presents with    Medical Problem     Feeling unwell since yesterday  Feels disoriented, off balance     Patient with a past history of strokes  States that at some point yesterday during the day he started not feeling well  Feels disoriented  Feels off balance  No nausea or vomiting  No headaches  On blood thinners  No trauma  No recent cough or cold symptoms  No fevers but has chills  No chest pain or shortness of breath  No change in speech or vision  No focal weakness or numbness  History provided by:  Patient   used: No    Altered Mental Status  Presenting symptoms: disorientation    Severity:  Mild  Most recent episode:  Yesterday  Episode history:  Continuous  Timing:  Constant  Progression:  Unchanged  Chronicity:  New  Context: not dementia, not drug use, not head injury, not nursing home resident and not recent change in medication    Associated symptoms: light-headedness    Associated symptoms: no abdominal pain, no bladder incontinence, no decreased appetite, no fever, no hallucinations, no headaches, no nausea, no palpitations, no rash, no seizures, no slurred speech, no visual change and no vomiting        Prior to Admission Medications   Prescriptions Last Dose Informant Patient Reported? Taking?    amLODIPine (NORVASC) 10 mg tablet   No No   Sig: Take 1 tablet (10 mg total) by mouth daily   aspirin (ECOTRIN LOW STRENGTH) 81 mg EC tablet   No No   Sig: Take 1 tablet (81 mg total) by mouth daily   atorvastatin (LIPITOR) 40 mg tablet   No No   Sig: Take 1 tablet (40 mg total) by mouth every evening   losartan-hydrochlorothiazide (HYZAAR) 50-12 5 mg per tablet   No No   Sig: Take 1 tablet by mouth 2 (two) times a day   sertraline (ZOLOFT) 50 mg tablet   No No   Sig: Take 1 tablet (50 mg total) by mouth daily      Facility-Administered Medications: None       Past Medical History:   Diagnosis Date    Gout     Hypertension     Pedal edema     Started on lasix 3/30/22    Positive colorectal cancer screening using Cologuard test 12/08/2021       History reviewed  No pertinent surgical history  History reviewed  No pertinent family history  I have reviewed and agree with the history as documented  E-Cigarette/Vaping    E-Cigarette Use Never User      E-Cigarette/Vaping Substances     Social History     Tobacco Use    Smoking status: Current Some Day Smoker    Smokeless tobacco: Never Used   Vaping Use    Vaping Use: Never used   Substance Use Topics    Alcohol use: Yes    Drug use: Yes     Types: Marijuana       Review of Systems   Constitutional: Negative for chills, decreased appetite and fever  HENT: Negative for ear pain, hearing loss, sore throat, trouble swallowing and voice change  Eyes: Negative for pain and discharge  Respiratory: Negative for cough, shortness of breath and wheezing  Cardiovascular: Negative for chest pain and palpitations  Gastrointestinal: Negative for abdominal pain, blood in stool, constipation, diarrhea, nausea and vomiting  Genitourinary: Negative for bladder incontinence, dysuria, flank pain, frequency and hematuria  Musculoskeletal: Negative for joint swelling, neck pain and neck stiffness  Skin: Negative for rash and wound  Neurological: Positive for light-headedness  Negative for dizziness, seizures, syncope, facial asymmetry and headaches  Psychiatric/Behavioral: Negative for hallucinations, self-injury and suicidal ideas  All other systems reviewed and are negative  Physical Exam  Physical Exam  Vitals and nursing note reviewed  Constitutional:       General: He is not in acute distress  Appearance: He is well-developed  HENT:      Head: Normocephalic and atraumatic  Right Ear: External ear normal       Left Ear: External ear normal    Eyes:      General: No scleral icterus  Right eye: No discharge  Left eye: No discharge        Extraocular Movements: Extraocular movements intact  Conjunctiva/sclera: Conjunctivae normal    Cardiovascular:      Rate and Rhythm: Normal rate and regular rhythm  Heart sounds: Normal heart sounds  No murmur heard  Pulmonary:      Effort: Pulmonary effort is normal       Breath sounds: Normal breath sounds  No wheezing or rales  Abdominal:      General: Bowel sounds are normal  There is no distension  Palpations: Abdomen is soft  Tenderness: There is no abdominal tenderness  There is no guarding or rebound  Musculoskeletal:         General: No deformity  Normal range of motion  Cervical back: Normal range of motion and neck supple  Skin:     General: Skin is warm and dry  Findings: No rash  Neurological:      General: No focal deficit present  Mental Status: He is alert and oriented to person, place, and time  Cranial Nerves: No cranial nerve deficit  Comments: Finger-to-nose and heel-to-shin without any difficulty  Psychiatric:         Mood and Affect: Mood normal          Behavior: Behavior normal          Thought Content:  Thought content normal          Judgment: Judgment normal          Vital Signs  ED Triage Vitals   Temperature Pulse Respirations Blood Pressure SpO2   07/26/22 2111 07/26/22 2111 07/26/22 2111 07/26/22 2111 07/26/22 2111   (!) 97 3 °F (36 3 °C) 71 18 146/84 94 %      Temp Source Heart Rate Source Patient Position - Orthostatic VS BP Location FiO2 (%)   07/26/22 2111 07/26/22 2111 07/26/22 2145 07/26/22 2111 --   Temporal Monitor Lying Right arm       Pain Score       07/26/22 2111       2           Vitals:    07/26/22 2111 07/26/22 2145   BP: 146/84 119/75   Pulse: 71 (!) 54   Patient Position - Orthostatic VS:  Lying         Visual Acuity      ED Medications  Medications   sodium chloride 0 9 % bolus 1,000 mL (1,000 mL Intravenous New Bag 7/26/22 2122)       Diagnostic Studies  Results Reviewed     Procedure Component Value Units Date/Time FLU/RSV/COVID - if FLU/RSV clinically relevant [[id number]]  (Abnormal) Collected: 07/26/22 2119    Lab Status: Final result Specimen: Nares from Nose Updated: 07/26/22 2208     SARS-CoV-2 Positive     INFLUENZA A PCR Negative     INFLUENZA B PCR Negative     RSV PCR Negative    Narrative:      FOR PEDIATRIC PATIENTS - copy/paste COVID Guidelines URL to browser: [email or web address]  [employer]    SARS-CoV-2 assay is a Nucleic Acid Amplification assay intended for the  qualitative detection of nucleic acid from SARS-CoV-2 in nasopharyngeal  swabs  Results are for the presumptive identification of SARS-CoV-2 RNA  Positive results are indicative of infection with SARS-CoV-2, the virus  causing COVID-19, but do not rule out bacterial infection or co-infection  with other viruses  Laboratories within the [place] and its  territories are required to report all positive results to the appropriate  public health authorities  Negative results do not preclude SARS-CoV-2  infection and should not be used as the sole basis for treatment or other  patient management decisions  Negative results must be combined with  clinical observations, patient history, and epidemiological information  This test has not been FDA cleared or approved  This test has been authorized by FDA under an Emergency Use Authorization  (EUA)  This test is only authorized for the duration of time the  declaration that circumstances exist justifying the authorization of the  emergency use of an in vitro diagnostic tests for detection of SARS-CoV-2  virus and/or diagnosis of COVID-19 infection under section 564(b)(1) of  the Act, 21 U  S C  119CFZ-9(M)(2), unless the authorization is terminated  or revoked sooner  The test has been validated but independent review by FDA  and CLIA is pending  Test performed using [employer]: This RT-PCR assay targets N2,  a region unique to SARS-CoV-2   A conserved region in the E-gene was chosen  for pan-Sarbecovirus detection which includes SARS-CoV-2  Lactic acid [[id number]]  (Normal) Collected: 07/26/22 2119    Lab Status: Final result Specimen: Blood from Arm, Left Updated: 07/26/22 2156     LACTIC ACID 1 5 mmol/L     Narrative:      Result may be elevated if tourniquet was used during collection      HS Troponin 0hr (reflex protocol) [[id number]]  (Normal) Collected: 07/26/22 2119    Lab Status: Final result Specimen: Blood from Arm, Left Updated: 07/26/22 2155     hs TnI 0hr 8 ng/L     HS Troponin I 2hr [[id number]]     Lab Status: No result Specimen: Blood     Comprehensive metabolic panel [[id number]]  (Abnormal) Collected: 07/26/22 2119    Lab Status: Final result Specimen: Blood from Arm, Left Updated: 07/26/22 2152     Sodium 139 mmol/L      Potassium 3 8 mmol/L      Chloride 102 mmol/L      CO2 31 mmol/L      ANION GAP 6 mmol/L      BUN 14 mg/dL      Creatinine 1 66 mg/dL      Glucose 63 mg/dL      Calcium 8 9 mg/dL      AST 28 U/L      ALT 42 U/L      Alkaline Phosphatase 77 U/L      Total Protein 7 8 g/dL      Albumin 3 8 g/dL      Total Bilirubin 0 52 mg/dL      eGFR 46 ml/min/1 73sq m     Narrative:      Tevin guidelines for Chronic Kidney Disease (CKD):     Stage 1 with normal or high GFR (GFR > 90 mL/min/1 73 square meters)    Stage 2 Mild CKD (GFR = 60-89 mL/min/1 73 square meters)    Stage 3A Moderate CKD (GFR = 45-59 mL/min/1 73 square meters)    Stage 3B Moderate CKD (GFR = 30-44 mL/min/1 73 square meters)    Stage 4 Severe CKD (GFR = 15-29 mL/min/1 73 square meters)    Stage 5 End Stage CKD (GFR <15 mL/min/1 73 square meters)  Note: GFR calculation is accurate only with a steady state creatinine    Lipase [[id number]]  (Normal) Collected: 07/26/22 2119    Lab Status: Final result Specimen: Blood from Arm, Left Updated: 07/26/22 2152     Lipase 274 u/L     Ethanol [[id number]]  (Normal) Collected: 07/26/22 2119 Lab Status: Final result Specimen: Blood from Arm, Left Updated: 07/26/22 2152     Ethanol Lvl <3 mg/dL     Protime-INR [[id number]]  (Normal) Collected: 07/26/22 2119    Lab Status: Final result Specimen: Blood from Arm, Left Updated: 07/26/22 2143     Protime 12 5 seconds      INR 0 92    APTT [[id number]]  (Normal) Collected: 07/26/22 2119    Lab Status: Final result Specimen: Blood from Arm, Left Updated: 07/26/22 2143     PTT 29 seconds     CBC and differential [[id number]] Collected: 07/26/22 2119    Lab Status: Final result Specimen: Blood from Arm, Left Updated: 07/26/22 2129     WBC 4 39 Thousand/uL      RBC 5 35 Million/uL      Hemoglobin 16 4 g/dL      Hematocrit 49 1 %      MCV 92 fL      MCH 30 7 pg      MCHC 33 4 g/dL      RDW 14 5 %      MPV 9 9 fL      Platelets 977 Thousands/uL      nRBC 0 /100 WBCs      Neutrophils Relative 46 %      Immat GRANS % 0 %      Lymphocytes Relative 35 %      Monocytes Relative 12 %      Eosinophils Relative 6 %      Basophils Relative 1 %      Neutrophils Absolute 2 02 Thousands/µL      Immature Grans Absolute 0 01 Thousand/uL      Lymphocytes Absolute 1 54 Thousands/µL      Monocytes Absolute 0 53 Thousand/µL      Eosinophils Absolute 0 26 Thousand/µL      Basophils Absolute 0 03 Thousands/µL     Rapid drug screen, urine [[id number]]     Lab Status: No result Specimen: Urine     UA w Reflex to Microscopic w Reflex to Culture [[id number]]     Lab Status: No result Specimen: Urine                  CT head without contrast   Final Result by Shanae Fish DO (07/26 2235)   No acute intracranial abnormality                    Workstation performed: [id number]                    Procedures  ECG 12 Lead Documentation Only    Date/Time: 7/26/2022 9:17 PM  Performed by: Lazarus Christine, MD  Authorized by: Lazarus Christine, MD     ECG reviewed by me, the ED Provider: yes    Patient location:  ED  Rate:     ECG rate:  60  Rhythm:     Rhythm: sinus rhythm    Ectopy:     Ectopy: none    QRS:     QRS axis:  Normal             ED Course  ED Course as of 07/26/22 2239 Tue Jul 26, 2022 2124 Patient ambulate without any difficulty  2153 Creatinine(!): 1 66  Creatinine was 1 6 last month  2231 Patient a nonfocal neurologic exam   Walking without any difficulty  Gait is steady  No loss of balance  Voice is normal   No slurred speech  Doubt neurologic etiology  Symptoms most likely from COVID-19 infection  MDM    Disposition  Final diagnoses:   [id number] virus infection     Time reflects when diagnosis was documented in both MDM as applicable and the Disposition within this note     Time User Action Codes Description Comment    7/26/2022 10:21 PM Quoc Aguirre Add [U07 1] COVID-19 virus infection       ED Disposition     ED Disposition   Discharge    Condition   Stable    Date/Time   Tue Jul 26, 2022 10:21 PM    Comment   Jorge Rogers discharge to home/self care  Follow-up Information    None         Patient's Medications   Discharge Prescriptions    No medications on file       No discharge procedures on file      PDMP Review     None          ED Provider  Electronically Signed by           Haley Javed MD  07/26/22 2239

## 2023-03-03 ENCOUNTER — ANESTHESIA (OUTPATIENT)
Dept: GASTROENTEROLOGY | Facility: HOSPITAL | Age: 53
End: 2023-03-03

## 2023-03-03 ENCOUNTER — HOSPITAL ENCOUNTER (OUTPATIENT)
Dept: GASTROENTEROLOGY | Facility: HOSPITAL | Age: 53
Setting detail: OUTPATIENT SURGERY
End: 2023-03-03
Attending: INTERNAL MEDICINE

## 2023-03-03 ENCOUNTER — ANESTHESIA EVENT (OUTPATIENT)
Dept: GASTROENTEROLOGY | Facility: HOSPITAL | Age: 53
End: 2023-03-03

## 2023-03-03 VITALS
OXYGEN SATURATION: 96 % | WEIGHT: 215 LBS | HEIGHT: 70 IN | RESPIRATION RATE: 44 BRPM | DIASTOLIC BLOOD PRESSURE: 93 MMHG | BODY MASS INDEX: 30.78 KG/M2 | HEART RATE: 52 BPM | TEMPERATURE: 97.6 F | SYSTOLIC BLOOD PRESSURE: 157 MMHG

## 2023-03-03 DIAGNOSIS — R19.5 HEME POSITIVE STOOL: ICD-10-CM

## 2023-03-03 PROBLEM — IMO0001 SMOKING: Status: ACTIVE | Noted: 2023-03-03

## 2023-03-03 PROBLEM — N40.1 BENIGN PROSTATIC HYPERPLASIA WITH LOWER URINARY TRACT SYMPTOMS: Status: ACTIVE | Noted: 2023-03-03

## 2023-03-03 PROBLEM — F17.200 SMOKING: Status: ACTIVE | Noted: 2023-03-03

## 2023-03-03 RX ORDER — SODIUM CHLORIDE, SODIUM LACTATE, POTASSIUM CHLORIDE, CALCIUM CHLORIDE 600; 310; 30; 20 MG/100ML; MG/100ML; MG/100ML; MG/100ML
INJECTION, SOLUTION INTRAVENOUS CONTINUOUS PRN
Status: DISCONTINUED | OUTPATIENT
Start: 2023-03-03 | End: 2023-03-03

## 2023-03-03 RX ORDER — PROPOFOL 10 MG/ML
INJECTION, EMULSION INTRAVENOUS CONTINUOUS PRN
Status: DISCONTINUED | OUTPATIENT
Start: 2023-03-03 | End: 2023-03-03

## 2023-03-03 RX ORDER — PROPOFOL 10 MG/ML
INJECTION, EMULSION INTRAVENOUS AS NEEDED
Status: DISCONTINUED | OUTPATIENT
Start: 2023-03-03 | End: 2023-03-03

## 2023-03-03 RX ORDER — LIDOCAINE HYDROCHLORIDE 10 MG/ML
INJECTION, SOLUTION EPIDURAL; INFILTRATION; INTRACAUDAL; PERINEURAL AS NEEDED
Status: DISCONTINUED | OUTPATIENT
Start: 2023-03-03 | End: 2023-03-03

## 2023-03-03 RX ORDER — LABETALOL HYDROCHLORIDE 5 MG/ML
INJECTION, SOLUTION INTRAVENOUS AS NEEDED
Status: DISCONTINUED | OUTPATIENT
Start: 2023-03-03 | End: 2023-03-03

## 2023-03-03 RX ORDER — TAMSULOSIN HYDROCHLORIDE 0.4 MG/1
CAPSULE ORAL
COMMUNITY
Start: 2022-11-18

## 2023-03-03 RX ADMIN — PROPOFOL 100 MG: 10 INJECTION, EMULSION INTRAVENOUS at 09:10

## 2023-03-03 RX ADMIN — LABETALOL HYDROCHLORIDE 5 MG: 5 INJECTION, SOLUTION INTRAVENOUS at 09:23

## 2023-03-03 RX ADMIN — SODIUM CHLORIDE, SODIUM LACTATE, POTASSIUM CHLORIDE, AND CALCIUM CHLORIDE: .6; .31; .03; .02 INJECTION, SOLUTION INTRAVENOUS at 09:06

## 2023-03-03 RX ADMIN — LABETALOL HYDROCHLORIDE 5 MG: 5 INJECTION, SOLUTION INTRAVENOUS at 09:27

## 2023-03-03 RX ADMIN — PROPOFOL 10 MG: 10 INJECTION, EMULSION INTRAVENOUS at 09:58

## 2023-03-03 RX ADMIN — PROPOFOL 150 MCG/KG/MIN: 10 INJECTION, EMULSION INTRAVENOUS at 09:11

## 2023-03-03 RX ADMIN — LIDOCAINE HYDROCHLORIDE 50 MG: 10 INJECTION, SOLUTION EPIDURAL; INFILTRATION; INTRACAUDAL at 09:10

## 2023-03-03 NOTE — ANESTHESIA PREPROCEDURE EVALUATION
Procedure:  COLONOSCOPY    Relevant Problems   ANESTHESIA (within normal limits)      CARDIO  Echo: 65%EF, G1DD, valves Ok   (+) Essential hypertension   (+) Hyperlipidemia   (+) Hypertension      /RENAL   (+) Benign prostatic hyperplasia with lower urinary tract symptoms   (+) Stage 2 chronic kidney disease      NEURO/PSYCH   (+) Current moderate episode of major depressive disorder without prior episode (HCC)   (+) Embolic stroke (HCC)      PULMONARY   (+) Smoking      Other   (+) Tobacco use        Physical Exam    Airway    Mallampati score: II  TM Distance: >3 FB  Neck ROM: full     Dental   No notable dental hx     Cardiovascular      Pulmonary      Other Findings        Anesthesia Plan  ASA Score- 3     Anesthesia Type- IV sedation with anesthesia with ASA Monitors  Additional Monitors:   Airway Plan:           Plan Factors-Exercise tolerance (METS): >4 METS  Chart reviewed  EKG reviewed  Existing labs reviewed  Patient summary reviewed  Patient is a current smoker  Patient did not smoke on day of surgery  Induction-     Postoperative Plan-     Informed Consent- Anesthetic plan and risks discussed with patient  I personally reviewed this patient with the CRNA  Discussed and agreed on the Anesthesia Plan with the CRNA  Sofi Milan

## 2023-03-03 NOTE — ANESTHESIA POSTPROCEDURE EVALUATION
Post-Op Assessment Note    CV Status:  Stable  Pain Score: 0    Pain management: adequate     Mental Status:  Sleepy   Hydration Status:  Stable   PONV Controlled:  Controlled   Airway Patency:  Patent      Post Op Vitals Reviewed: Yes      Staff: CRNA         No notable events documented      BP  140/74   Temp   97 6   Pulse  54   Resp   14   SpO2   95

## 2023-03-03 NOTE — H&P
History and Physical - [facility] at [address] [facility] 47 y o  male MRN: [id number]                  HPI: Duke Johnson is a 47y o  year old male who presents for colonoscopy for positive Cologuard  REVIEW OF SYSTEMS: Per the HPI, and otherwise unremarkable  Historical Information   Past Medical History:   Diagnosis Date   • [facility]    • COVID-19     7/26/22   • Gout    • Hyperlipidemia    • Hypertension    • Pedal edema     Started on lasix 3/30/22   • Positive colorectal cancer screening using Cologuard test 12/08/2021   • Stroke [facility])      Past Surgical History:   Procedure Laterality Date   • NO PAST SURGERIES       Social History   Social History     Substance and Sexual Activity   Alcohol Use Yes     Social History     Substance and Sexual Activity   Drug Use Yes   • Types: Marijuana     Social History     Tobacco Use   Smoking Status Some Days   Smokeless Tobacco Never     History reviewed  No pertinent family history  Meds/Allergies     (Not in a hospital admission)      No Known Allergies    Objective     Blood pressure (!) 176/84, pulse 57, temperature 97 5 °F (36 4 °C), temperature source Axillary, resp  rate 18, height 5' 10" (1 778 m), weight 97 5 kg (215 lb), SpO2 97 %  PHYSICAL EXAM    Gen: NAD  CV: RRR  CHEST: Clear  ABD: soft, NT/ND  EXT: no edema      ASSESSMENT/PLAN:  This is a 47y o  year old male here for colonoscopy for positive Cologuard  Patient is stable and optimized for procedure      PLAN:   Procedure: Colonoscopy

## 2023-05-11 ENCOUNTER — HOSPITAL ENCOUNTER (OUTPATIENT)
Dept: MRI IMAGING | Facility: HOSPITAL | Age: 53
Discharge: HOME | End: 2023-05-11

## 2023-05-11 ENCOUNTER — APPOINTMENT (OUTPATIENT)
Dept: RADIOLOGY | Facility: HOSPITAL | Age: 53
End: 2023-05-11

## 2023-05-11 DIAGNOSIS — I67.2 CEREBRAL ATHEROSCLEROSIS: ICD-10-CM

## 2023-05-11 DIAGNOSIS — Z86.73 PERSONAL HISTORY OF TRANSIENT ISCHEMIC ATTACK (TIA), AND CEREBRAL INFARCTION WITHOUT RESIDUAL DEFICITS: ICD-10-CM

## 2023-05-11 RX ADMIN — GADOBUTROL 10 ML: 604.72 INJECTION INTRAVENOUS at 16:58

## 2024-01-01 NOTE — TELEPHONE ENCOUNTER
I l/m on patients voicemail instructing him that he should schedule a follow up appointment  with his PCP Dr Yee Arzate  I did give patient my work number in case he would like to speak with me for any reason  non-reactive

## 2025-02-12 ENCOUNTER — OFFICE VISIT (OUTPATIENT)
Dept: URGENT CARE | Facility: CLINIC | Age: 55
End: 2025-02-12
Payer: COMMERCIAL

## 2025-02-12 ENCOUNTER — APPOINTMENT (OUTPATIENT)
Dept: RADIOLOGY | Facility: CLINIC | Age: 55
End: 2025-02-12
Payer: COMMERCIAL

## 2025-02-12 VITALS
SYSTOLIC BLOOD PRESSURE: 112 MMHG | HEART RATE: 92 BPM | OXYGEN SATURATION: 98 % | BODY MASS INDEX: 31.87 KG/M2 | TEMPERATURE: 96.3 F | DIASTOLIC BLOOD PRESSURE: 68 MMHG | HEIGHT: 70 IN | RESPIRATION RATE: 18 BRPM | WEIGHT: 222.6 LBS

## 2025-02-12 DIAGNOSIS — J22 LOWER RESPIRATORY INFECTION (E.G., BRONCHITIS, PNEUMONIA, PNEUMONITIS, PULMONITIS): Primary | ICD-10-CM

## 2025-02-12 DIAGNOSIS — R06.02 SOB (SHORTNESS OF BREATH): ICD-10-CM

## 2025-02-12 PROCEDURE — 99214 OFFICE O/P EST MOD 30 MIN: CPT

## 2025-02-12 PROCEDURE — 94640 AIRWAY INHALATION TREATMENT: CPT

## 2025-02-12 PROCEDURE — 71046 X-RAY EXAM CHEST 2 VIEWS: CPT

## 2025-02-12 PROCEDURE — S9088 SERVICES PROVIDED IN URGENT: HCPCS

## 2025-02-12 RX ORDER — IPRATROPIUM BROMIDE AND ALBUTEROL SULFATE 2.5; .5 MG/3ML; MG/3ML
3 SOLUTION RESPIRATORY (INHALATION) 4 TIMES DAILY
Qty: 180 ML | Refills: 0 | Status: SHIPPED | OUTPATIENT
Start: 2025-02-12

## 2025-02-12 RX ORDER — ALFUZOSIN HYDROCHLORIDE 10 MG/1
TABLET, EXTENDED RELEASE ORAL
COMMUNITY
Start: 2024-10-30

## 2025-02-12 RX ORDER — IPRATROPIUM BROMIDE AND ALBUTEROL SULFATE 2.5; .5 MG/3ML; MG/3ML
3 SOLUTION RESPIRATORY (INHALATION) ONCE
Status: COMPLETED | OUTPATIENT
Start: 2025-02-12 | End: 2025-02-12

## 2025-02-12 RX ORDER — BENZONATATE 200 MG/1
200 CAPSULE ORAL 3 TIMES DAILY PRN
Qty: 20 CAPSULE | Refills: 0 | Status: SHIPPED | OUTPATIENT
Start: 2025-02-12

## 2025-02-12 RX ORDER — AZITHROMYCIN 250 MG/1
TABLET, FILM COATED ORAL
Qty: 6 TABLET | Refills: 0 | Status: SHIPPED | OUTPATIENT
Start: 2025-02-12 | End: 2025-02-16

## 2025-02-12 RX ORDER — PREDNISONE 10 MG/1
TABLET ORAL
Qty: 15 TABLET | Refills: 0 | Status: SHIPPED | OUTPATIENT
Start: 2025-02-12 | End: 2025-02-16

## 2025-02-12 RX ADMIN — IPRATROPIUM BROMIDE AND ALBUTEROL SULFATE 3 ML: 2.5; .5 SOLUTION RESPIRATORY (INHALATION) at 09:59

## 2025-02-12 NOTE — PROGRESS NOTES
[facility]        NAME: Mike Bacon is a 54 y.o. male  : 1970    MRN: [id number]  DATE: 2025  TIME: 10:22 AM    Assessment and Plan   Lower respiratory infection (e.g., bronchitis, pneumonia, pneumonitis, pulmonitis) [J22]  1. Lower respiratory infection (e.g., bronchitis, pneumonia, pneumonitis, pulmonitis)  XR chest pa and lateral    ipratropium-albuterol (DUO-NEB) 0.5-2.5 mg/3 mL inhalation solution 3 mL    azithromycin (ZITHROMAX) 250 mg tablet    predniSONE 10 mg tablet    benzonatate (TESSALON) 200 MG capsule    ipratropium-albuterol (DUO-NEB) 0.5-2.5 mg/3 mL nebulizer solution    Nebulizer    Mini neb    CANCELED: XR chest pa and lateral        Xray initial interpretation: chest xray -no acute cardiopulmonary abnormality  Official radiology read pending - We will only notify you if there needs to be a change in your treatment plan.   Mini neb administered in clinic with improvement of symptoms - machine sent home with pt    Patient Instructions   Xray initial interpretation: chest xray -no acute cardiopulmonary abnormality  Official radiology read pending - We will only notify you if there needs to be a change in your treatment plan.     Take full course of Azithromycin as prescribed  Eat yogurt with live and active cultures and/or take a probiotic at least 3 hours before or after antibiotic dose. Monitor stool for diarrhea and/or blood. If this occurs, contact primary care doctor ASAP.   Take prednisone as prescribed - take in morning with food  Take tessalon perles as prescribed for cough.   Use duoneb medication with nebulizer    Take over the counter Mucinex during the day  Take over the counter cough suppressant at night  Fluids and rest (Warm water with honey and lemon)  Tylenol/Ibuprofen for pain fever    Follow up with PCP in 3-5 days.  Proceed to  ER if symptoms worsen.    If tests are performed, our office will contact you with results only if changes need to  made to the care plan discussed with you at the visit. You can review your full results on [facility].    Chief Complaint     Chief Complaint   Patient presents with   • Cold Like Symptoms     C/o chest congestion, SOB. Onset x7 days ago.          History of Present Illness       54-year-old male arrives reporting cough and congestion ongoing for the past week.  Patient reports he is coughing so much it is making him feel short of breath at times.  Patient reports fevers at the onset of the illness but none recently.  Patient denies any current shortness of breath, chest pain or abdominal pain.  Patient denies any sick contact exposures at home.    Review of Systems   Review of Systems   Constitutional:  Positive for chills, fatigue and fever.   HENT:  Positive for congestion. Negative for ear pain, sinus pressure, sinus pain and sore throat.    Respiratory:  Positive for cough and shortness of breath.    Cardiovascular: Negative.    Gastrointestinal: Negative.    Musculoskeletal: Negative.          Current Medications       Current Outpatient Medications:   •  alfuzosin (UROXATRAL) 10 mg 24 hr tablet, TAKE 1 TABLET BY MOUTH EACH MORNING., Disp: , Rfl:   •  amLODIPine (NORVASC) 10 mg tablet, Take 1 tablet (10 mg total) by mouth daily, Disp: 30 tablet, Rfl: 0  •  aspirin (ECOTRIN LOW STRENGTH) 81 mg EC tablet, Take 1 tablet (81 mg total) by mouth daily, Disp:  , Rfl: 0  •  atorvastatin (LIPITOR) 40 mg tablet, Take 1 tablet (40 mg total) by mouth every evening, Disp: 30 tablet, Rfl: 0  •  azithromycin (ZITHROMAX) 250 mg tablet, Take 2 tablets today then 1 tablet daily x 4 days, Disp: 6 tablet, Rfl: 0  •  benzonatate (TESSALON) 200 MG capsule, Take 1 capsule (200 mg total) by mouth 3 (three) times a day as needed for cough, Disp: 20 capsule, Rfl: 0  •  carvedilol (COREG) 12.5 mg tablet, take 1 tablet by mouth every morning and 1 tablet at bedtime, Disp: , Rfl:   •  furosemide (LASIX) 40 mg tablet, Take 40 mg by  "mouth every morning, Disp: , Rfl:   •  ipratropium-albuterol (DUO-NEB) 0.5-2.5 mg/3 mL nebulizer solution, Take 3 mL by nebulization 4 (four) times a day, Disp: 180 mL, Rfl: 0  •  losartan-hydrochlorothiazide (HYZAAR) 50-12.5 mg per tablet, Take 1 tablet by mouth 2 (two) times a day, Disp: 60 tablet, Rfl: 0  •  predniSONE 10 mg tablet, Take 5 tablets (50 mg total) by mouth daily for 1 day, THEN 4 tablets (40 mg total) daily for 1 day, THEN 3 tablets (30 mg total) daily for 1 day, THEN 2 tablets (20 mg total) daily for 1 day, THEN 1 tablet (10 mg total) daily for 1 day., Disp: 15 tablet, Rfl: 0  •  sertraline (ZOLOFT) 50 mg tablet, Take 1 tablet (50 mg total) by mouth daily, Disp: 30 tablet, Rfl: 0  •  tamsulosin (FLOMAX) 0.4 mg, 2 caps at night (Patient not taking: Reported on 2/12/2025), Disp: , Rfl:   No current facility-administered medications for this visit.    Current Allergies     Allergies as of 02/12/2025 - Reviewed 02/12/2025   Allergen Reaction Noted   • Bee venom Other (See Comments) 01/12/2023            The following portions of the patient's history were reviewed and updated as appropriate: allergies, current medications, past family history, past medical history, past social history, past surgical history and problem list.     Past Medical History:   Diagnosis Date   • CHF (congestive heart failure) (HCC)    • COVID-19     7/26/22   • Gout    • Hyperlipidemia    • Hypertension    • Pedal edema     Started on lasix 3/30/22   • Positive colorectal cancer screening using Cologuard test 12/08/2021   • Stroke (HCC)        Past Surgical History:   Procedure Laterality Date   • NO PAST SURGERIES         No family history on file.      Medications have been verified.        Objective   /68   Pulse 92   Temp (!) 96.3 °F (35.7 °C)   Resp 18   Ht 5' 10\" (1.778 m)   Wt 101 kg (222 lb 9.6 oz)   SpO2 98%   BMI 31.94 kg/m²        Physical Exam     Physical Exam  Vitals and nursing note reviewed. "   Constitutional:       Appearance: Normal appearance. He is ill-appearing. He is not toxic-appearing or diaphoretic.   HENT:      Head: Normocephalic.      Right Ear: Tympanic membrane, ear canal and external ear normal.      Left Ear: Tympanic membrane, ear canal and external ear normal.      Nose: Congestion present.      Mouth/Throat:      Mouth: Mucous membranes are moist.      Pharynx: No posterior oropharyngeal erythema.   Eyes:      Extraocular Movements: Extraocular movements intact.      Pupils: Pupils are equal, round, and reactive to light.   Cardiovascular:      Rate and Rhythm: Normal rate.      Pulses: Normal pulses.      Heart sounds: Normal heart sounds.   Pulmonary:      Effort: Pulmonary effort is normal. No respiratory distress.      Breath sounds: No stridor. Examination of the right-upper field reveals wheezing. Examination of the left-upper field reveals wheezing. Examination of the right-middle field reveals wheezing. Examination of the left-middle field reveals wheezing. Examination of the right-lower field reveals decreased breath sounds, wheezing and rhonchi. Examination of the left-lower field reveals decreased breath sounds, wheezing and rhonchi. Decreased breath sounds, wheezing and rhonchi present. No rales.   Chest:      Chest wall: No tenderness.   Musculoskeletal:         General: Normal range of motion.      Cervical back: Normal range of motion.   Lymphadenopathy:      Cervical: No cervical adenopathy.   Skin:     General: Skin is warm and dry.      Capillary Refill: Capillary refill takes less than 2 seconds.   Neurological:      General: No focal deficit present.      Mental Status: He is alert and oriented to person, place, and time.   Psychiatric:         Mood and Affect: Mood normal.       Mini neb    Performed by: PEEWEE Reynoso  Authorized by: PEEWEE Reynoso  Universal Protocol:  Procedure performed by: ([email or web address] hare)  Consent: Verbal consent  obtained.  Risks and benefits: risks, benefits and alternatives were discussed  Consent given by: patient  Patient understanding: patient states understanding of the procedure being performed  Patient identity confirmed: verbally with patient    Number of treatments:  1  Treatment 1:   Pre-Procedure     Symptoms:  Wheezing, labored breathing and cough    Lung Sounds:  Wheezing tight    SP02:  98    Medication Administered:  Duoneb - Albuterol 2.5 mg/Atrovent 0.5 mg  Post-Procedure     Symptoms:  Cough    Lung sounds:  Scattered wheezing but increased air movement    SP02:  99

## 2025-02-12 NOTE — PATIENT INSTRUCTIONS
Xray initial interpretation: chest xray -no acute cardiopulmonary abnormality  Official radiology read pending - We will only notify you if there needs to be a change in your treatment plan.     Take full course of Azithromycin as prescribed  Eat yogurt with live and active cultures and/or take a probiotic at least 3 hours before or after antibiotic dose. Monitor stool for diarrhea and/or blood. If this occurs, contact primary care doctor ASAP.   Take prednisone as prescribed - take in morning with food  Take tessalon perles as prescribed for cough.   Use duoneb medication with nebulizer    Take over the counter Mucinex during the day  Take over the counter cough suppressant at night  Fluids and rest (Warm water with honey and lemon)  Tylenol/Ibuprofen for pain fever    Follow up with PCP in 3-5 days.  Proceed to  ER if symptoms worsen.    If tests are performed, our office will contact you with results only if changes need to made to the care plan discussed with you at the visit. You can review your full results on [facility].

## 2025-07-03 ENCOUNTER — HOSPITAL ENCOUNTER (OUTPATIENT)
Dept: CT IMAGING | Facility: HOSPITAL | Age: 55
End: 2025-07-03
Attending: INTERNAL MEDICINE
Payer: COMMERCIAL

## 2025-07-03 DIAGNOSIS — Z72.0 TOBACCO ABUSE: ICD-10-CM

## 2025-07-03 PROCEDURE — 71271 CT THORAX LUNG CANCER SCR C-: CPT
